# Patient Record
Sex: MALE | Race: WHITE | Employment: FULL TIME | URBAN - METROPOLITAN AREA
[De-identification: names, ages, dates, MRNs, and addresses within clinical notes are randomized per-mention and may not be internally consistent; named-entity substitution may affect disease eponyms.]

---

## 2019-05-31 ENCOUNTER — OFFICE VISIT (OUTPATIENT)
Dept: FAMILY MEDICINE CLINIC | Age: 39
End: 2019-05-31
Payer: COMMERCIAL

## 2019-05-31 VITALS
WEIGHT: 262 LBS | DIASTOLIC BLOOD PRESSURE: 80 MMHG | SYSTOLIC BLOOD PRESSURE: 120 MMHG | OXYGEN SATURATION: 97 % | HEART RATE: 69 BPM | HEIGHT: 72 IN | BODY MASS INDEX: 35.49 KG/M2

## 2019-05-31 DIAGNOSIS — Z87.898 HISTORY OF ALCOHOL USE DISORDER: ICD-10-CM

## 2019-05-31 DIAGNOSIS — Z76.89 ENCOUNTER TO ESTABLISH CARE: Primary | ICD-10-CM

## 2019-05-31 DIAGNOSIS — G89.29 CHRONIC RIGHT-SIDED LOW BACK PAIN, WITH SCIATICA PRESENCE UNSPECIFIED: ICD-10-CM

## 2019-05-31 DIAGNOSIS — M54.6 CHRONIC RIGHT-SIDED THORACIC BACK PAIN: ICD-10-CM

## 2019-05-31 DIAGNOSIS — M54.5 CHRONIC RIGHT-SIDED LOW BACK PAIN, WITH SCIATICA PRESENCE UNSPECIFIED: ICD-10-CM

## 2019-05-31 DIAGNOSIS — Z00.00 HEALTHCARE MAINTENANCE: ICD-10-CM

## 2019-05-31 DIAGNOSIS — G89.29 CHRONIC RIGHT-SIDED THORACIC BACK PAIN: ICD-10-CM

## 2019-05-31 DIAGNOSIS — E66.09 CLASS 2 OBESITY DUE TO EXCESS CALORIES WITH BODY MASS INDEX (BMI) OF 36.0 TO 36.9 IN ADULT, UNSPECIFIED WHETHER SERIOUS COMORBIDITY PRESENT: ICD-10-CM

## 2019-05-31 PROBLEM — E66.812 CLASS 2 OBESITY DUE TO EXCESS CALORIES WITH BODY MASS INDEX (BMI) OF 36.0 TO 36.9 IN ADULT: Status: ACTIVE | Noted: 2019-05-31

## 2019-05-31 PROBLEM — M54.50 CHRONIC RIGHT-SIDED LOW BACK PAIN: Status: ACTIVE | Noted: 2019-05-31

## 2019-05-31 LAB
A/G RATIO: 1.7 (ref 1.1–2.2)
ALBUMIN SERPL-MCNC: 4.7 G/DL (ref 3.4–5)
ALP BLD-CCNC: 58 U/L (ref 40–129)
ALT SERPL-CCNC: 28 U/L (ref 10–40)
ANION GAP SERPL CALCULATED.3IONS-SCNC: 12 MMOL/L (ref 3–16)
AST SERPL-CCNC: 17 U/L (ref 15–37)
BASOPHILS ABSOLUTE: 0.1 K/UL (ref 0–0.2)
BASOPHILS RELATIVE PERCENT: 1 %
BILIRUB SERPL-MCNC: 0.9 MG/DL (ref 0–1)
BUN BLDV-MCNC: 14 MG/DL (ref 7–20)
CALCIUM SERPL-MCNC: 9.1 MG/DL (ref 8.3–10.6)
CHLORIDE BLD-SCNC: 103 MMOL/L (ref 99–110)
CHOLESTEROL, TOTAL: 84 MG/DL (ref 0–199)
CO2: 25 MMOL/L (ref 21–32)
CREAT SERPL-MCNC: 0.7 MG/DL (ref 0.9–1.3)
EOSINOPHILS ABSOLUTE: 0.1 K/UL (ref 0–0.6)
EOSINOPHILS RELATIVE PERCENT: 1.7 %
GFR AFRICAN AMERICAN: >60
GFR NON-AFRICAN AMERICAN: >60
GLOBULIN: 2.8 G/DL
GLUCOSE BLD-MCNC: 117 MG/DL (ref 70–99)
HCT VFR BLD CALC: 43.3 % (ref 40.5–52.5)
HDLC SERPL-MCNC: 33 MG/DL (ref 40–60)
HEMOGLOBIN: 14.2 G/DL (ref 13.5–17.5)
LDL CHOLESTEROL CALCULATED: 17 MG/DL
LYMPHOCYTES ABSOLUTE: 1.3 K/UL (ref 1–5.1)
LYMPHOCYTES RELATIVE PERCENT: 23.3 %
MCH RBC QN AUTO: 30 PG (ref 26–34)
MCHC RBC AUTO-ENTMCNC: 32.9 G/DL (ref 31–36)
MCV RBC AUTO: 91.1 FL (ref 80–100)
MONOCYTES ABSOLUTE: 0.5 K/UL (ref 0–1.3)
MONOCYTES RELATIVE PERCENT: 10 %
NEUTROPHILS ABSOLUTE: 3.5 K/UL (ref 1.7–7.7)
NEUTROPHILS RELATIVE PERCENT: 64 %
PDW BLD-RTO: 13.8 % (ref 12.4–15.4)
PLATELET # BLD: 201 K/UL (ref 135–450)
PMV BLD AUTO: 8.6 FL (ref 5–10.5)
POTASSIUM SERPL-SCNC: 4.4 MMOL/L (ref 3.5–5.1)
RBC # BLD: 4.75 M/UL (ref 4.2–5.9)
SODIUM BLD-SCNC: 140 MMOL/L (ref 136–145)
TOTAL PROTEIN: 7.5 G/DL (ref 6.4–8.2)
TRIGL SERPL-MCNC: 168 MG/DL (ref 0–150)
TSH REFLEX: 0.92 UIU/ML (ref 0.27–4.2)
VLDLC SERPL CALC-MCNC: 34 MG/DL
WBC # BLD: 5.4 K/UL (ref 4–11)

## 2019-05-31 PROCEDURE — 99203 OFFICE O/P NEW LOW 30 MIN: CPT | Performed by: FAMILY MEDICINE

## 2019-05-31 RX ORDER — NAPROXEN 500 MG/1
500 TABLET ORAL 2 TIMES DAILY WITH MEALS
Qty: 60 TABLET | Refills: 1 | Status: SHIPPED | OUTPATIENT
Start: 2019-05-31 | End: 2020-03-13 | Stop reason: ALTCHOICE

## 2019-05-31 ASSESSMENT — PATIENT HEALTH QUESTIONNAIRE - PHQ9
SUM OF ALL RESPONSES TO PHQ9 QUESTIONS 1 & 2: 1
2. FEELING DOWN, DEPRESSED OR HOPELESS: 1
7. TROUBLE CONCENTRATING ON THINGS, SUCH AS READING THE NEWSPAPER OR WATCHING TELEVISION: 0
SUM OF ALL RESPONSES TO PHQ QUESTIONS 1-9: 5
5. POOR APPETITE OR OVEREATING: 1
SUM OF ALL RESPONSES TO PHQ QUESTIONS 1-9: 5
9. THOUGHTS THAT YOU WOULD BE BETTER OFF DEAD, OR OF HURTING YOURSELF: 0
6. FEELING BAD ABOUT YOURSELF - OR THAT YOU ARE A FAILURE OR HAVE LET YOURSELF OR YOUR FAMILY DOWN: 0
8. MOVING OR SPEAKING SO SLOWLY THAT OTHER PEOPLE COULD HAVE NOTICED. OR THE OPPOSITE, BEING SO FIGETY OR RESTLESS THAT YOU HAVE BEEN MOVING AROUND A LOT MORE THAN USUAL: 0
4. FEELING TIRED OR HAVING LITTLE ENERGY: 2
3. TROUBLE FALLING OR STAYING ASLEEP: 1
1. LITTLE INTEREST OR PLEASURE IN DOING THINGS: 0

## 2019-05-31 ASSESSMENT — ENCOUNTER SYMPTOMS
CONSTIPATION: 0
NAUSEA: 0
VOMITING: 0
DIARRHEA: 0
COLOR CHANGE: 0
TROUBLE SWALLOWING: 0
ABDOMINAL PAIN: 0
BLOOD IN STOOL: 0
SHORTNESS OF BREATH: 0
BACK PAIN: 1

## 2019-05-31 NOTE — PROGRESS NOTES
2019    This is a 44 y.o. male who presents for  Chief Complaint   Patient presents with   [address] Patient    Establish Care     No pcp in over 10 years    Back Pain       HPI:     In addition to CC above and ROS below:    now, fumes and fiberglass dust     Used to be a heavy drinker   Quit at age [age]   Case a day in his 25s     Back pain, mid right side  Not consistent  No triggers  No breathing problems    Ear pain, can't pinpoint  When he touches it, can't reproduce      Past Medical History:   Diagnosis Date    History of chicken pox        Past Surgical History:   Procedure Laterality Date    HERNIA REPAIR         Social History     Socioeconomic History    Marital status:      Spouse name: Not on file    Number of children: Not on file    Years of education: Not on file    Highest education level: Not on file   Occupational History    Not on file   Social Needs    Financial resource strain: Not on file    Food insecurity:     Worry: Not on file     Inability: Not on file    Transportation needs:     Medical: Not on file     Non-medical: Not on file   Tobacco Use    Smoking status: Former Smoker     Packs/day: 0.50     Start date:      Last attempt to quit: 2017     Years since quittin.4    Smokeless tobacco: Never Used   Substance and Sexual Activity    Alcohol use: Yes     Comment: Rarely 6 pack last 6-7 months    Drug use: Never    Sexual activity: Yes     Partners: Female     Comment: wife    Lifestyle    Physical activity:     Days per week: Not on file     Minutes per session: Not on file    Stress: Not on file   Relationships    Social connections:     Talks on phone: Not on file     Gets together: Not on file     Attends [employer] service: Not on file     Active member of club or organization: Not on file     Attends meetings of clubs or organizations: Not on file     Relationship status: Not on file    Intimate partner violence:     Fear of current or and well-nourished. No distress. HENT:   Head: Normocephalic and atraumatic. Right Ear: External ear normal.   Left Ear: External ear normal.   Eyes: Pupils are equal, round, and reactive to light. EOM are normal. Right eye exhibits no discharge. Left eye exhibits no discharge. Neck: Normal range of motion. Neck supple. No thyromegaly present. Cardiovascular: Normal rate, regular rhythm, normal heart sounds and intact distal pulses. Exam reveals no gallop and no friction rub. No murmur heard. Pulmonary/Chest: Effort normal and breath sounds normal. No respiratory distress. He has no wheezes. He has no rales. Abdominal: Soft. Bowel sounds are normal. He exhibits no distension and no mass. There is no tenderness. There is no guarding. No hernia. Neg amy's    Musculoskeletal: Normal range of motion. He exhibits no edema or tenderness. Thoracic back: He exhibits deformity (mildly inc muscle hypertrophy medial to scapula), pain and spasm. He exhibits normal range of motion, no tenderness, no bony tenderness, no swelling, no edema and no laceration. Back:    Lymphadenopathy:     He has no cervical adenopathy. Neurological: He is alert. He displays normal reflexes. No cranial nerve deficit or sensory deficit. Coordination normal.   Skin: Skin is warm and dry. He is not diaphoretic. No erythema. Psychiatric: He has a normal mood and affect. His behavior is normal. Judgment and thought content normal.   Nursing note and vitals reviewed. Plan  1. Encounter to establish care    2. Healthcare maintenance  Physical due in 3/20  - Comprehensive Metabolic Panel; Future  - CBC Auto Differential; Future  - Hemoglobin A1C; Future  - HIV Screen; Future  - Lipid Panel; Future  - TSH with Reflex; Future    3. Chronic right-sided low back pain, with sciatica presence unspecified  H/o with stretched given, work on posture. Muscle relaxer in the future if needed.   Schedule NSAIDs x14 days with food.  Heat/ice discussed. - naproxen (EC NAPROSYN) 500 MG EC tablet; Take 1 tablet by mouth 2 times daily (with meals)  Dispense: 60 tablet; Refill: 1  - [facility] Sariah Villalobos    4. Chronic right-sided thoracic back pain  Plan per above. US to assess remote possibility of Liver/GB etiology     5. History of alcohol use disorder  Drank daily in his 25s, quit at age [age]  - [address]; Future    6. Class 2 obesity due to excess calories with body mass index (BMI) of 36.0 to 36.9 in adult, unspecified whether serious comorbidity present  Nutrition discussed. Healthy content, appropriate portion control, and guidelines for daily exercise (min 30 mins daily of moderate cardio exercise x5 days/week and work up) all discussed. Recommendations made based on pt's health history and lifestyle. While assessing care for this patient, I have reviewed all pertinent lab work/imaging/ specialist notes and care in reference to those problems addressed above in detail. Appropriate medical decision making was based on this. Please note that portions of this note may have been completed with a voice recognition program. Efforts were made to edit the dictations but occasionally words are mis-transcribed.     Return in about 9 months (around 3/4/2020) for annual physical.

## 2019-05-31 NOTE — PATIENT INSTRUCTIONS
Patient Education        Learning About How to Have a Healthy Back  What causes back pain? Back pain is often caused by overuse, strain, or injury. For example, people often hurt their backs playing sports or working in the yard, being jolted in a car accident, or lifting something too heavy. Aging plays a part too. Your bones and muscles tend to lose strength as you age, which makes injury more likely. The spongy discs between the bones of the spine (vertebrae) may suffer from wear and tear and no longer provide enough cushion between the bones. A disc that bulges or breaks open (herniated disc) can press on nerves, causing back pain. In some people, back pain is the result of arthritis, broken vertebrae caused by bone loss (osteoporosis), illness, or a spine problem. Although most people have back pain at one time or another, there are steps you can take to make it less likely. How can you have a healthy back? Reduce stress on your back through good posture  Slumping or slouching alone may not cause low back pain. But after the back has been strained or injured, bad posture can make pain worse. · Sleep in a position that maintains your back's normal curves and on a mattress that feels comfortable. Sleep on your side with a pillow between your knees, or sleep on your back with a pillow under your knees. These positions can reduce strain on your back. · Stand and sit up straight. \"Good posture\" generally means your ears, shoulders, and hips are in a straight line. · If you must stand for a long time, put one foot on a stool, ledge, or box. Switch feet every now and then. · Sit in a chair that is low enough to let you place both feet flat on the floor with both knees nearly level with your hips. If your chair or desk is too high, use a footrest to raise your knees. Place a small pillow, a rolled-up towel, or a lumbar roll in the curve of your back if you need extra support.   · Try a kneeling chair, which helps tilt your hips forward. This takes pressure off your lower back. · Try sitting on an exercise ball. It can rock from side to side, which helps keep your back loose. · When driving, keep your knees nearly level with your hips. Sit straight, and drive with both hands on the steering wheel. Your arms should be in a slightly bent position. Reduce stress on your back through careful lifting  · Squat down, bending at the hips and knees only. If you need to, put one knee to the floor and extend your other knee in front of you, bent at a right angle (half kneeling). · Press your chest straight forward. This helps keep your upper back straight while keeping a slight arch in your low back. · Hold the load as close to your body as possible, at the level of your belly button (navel). · Use your feet to change direction, taking small steps. · Lead with your hips as you change direction. Keep your shoulders in line with your hips as you move. · Set down your load carefully, squatting with your knees and hips only. Exercise and stretch your back  · Do some exercise on most days of the week, if your doctor says it is okay. You can walk, run, swim, or cycle. · Stretch your back muscles. Here are a few exercises to try:  ? Lie on your back, and gently pull one bent knee to your chest. Put that foot back on the floor, and then pull the other knee to your chest.  ? Do pelvic tilts. Lie on your back with your knees bent. Tighten your stomach muscles. Pull your belly button (navel) in and up toward your ribs. You should feel like your back is pressing to the floor and your hips and pelvis are slightly lifting off the floor. Hold for 6 seconds while breathing smoothly. ? Sit with your back flat against a wall. · Keep your core muscles strong. The muscles of your back, belly (abdomen), and buttocks support your spine. ? Pull in your belly and imagine pulling your navel toward your spine.  Hold this for 6 seconds, then relax. Remember to keep breathing normally as you tense your muscles. ? Do curl-ups. Always do them with your knees bent. Keep your low back on the floor, and curl your shoulders toward your knees using a smooth, slow motion. Keep your arms folded across your chest. If this bothers your neck, try putting your hands behind your neck (not your head), with your elbows spread apart. ? Lie on your back with your knees bent and your feet flat on the floor. Tighten your belly muscles, and then push with your feet and raise your buttocks up a few inches. Hold this position 6 seconds as you continue to breathe normally, then lower yourself slowly to the floor. Repeat 8 to 12 times. ? If you like group exercise, try Pilates or yoga. These classes have poses that strengthen the core muscles. Lead a healthy lifestyle  · Stay at a healthy weight to avoid strain on your back. · Do not smoke. Smoking increases the risk of osteoporosis, which weakens the spine. If you need help quitting, talk to your doctor about stop-smoking programs and medicines. These can increase your chances of quitting for good. Where can you learn more? Go to https://RocketboompeKilimanjaro Energy.Turbogen. org and sign in to your [employer] account. Enter L315 in the LikeAndy box to learn more about \"Learning About How to Have a Healthy Back. \"     If you do not have an account, please click on the \"Sign Up Now\" link. Current as of: September 20, 2018  Content Version: 12.0  © 8994-8039 Healthwise, Incorporated. Care instructions adapted under license by [facility] [employer] [facility] ([facility]). If you have questions about a medical condition or this instruction, always ask your healthcare professional. [place] [address] any warranty or liability for your use of this information.          Patient Education        Back Care and Preventing Injuries: Care Instructions  Your Care Instructions    You can hurt your back doing many everyday activities: lifting a heavy box, bending down to garden, exercising at the gym, and even getting out of bed. But you can keep your back strong and healthy by doing some exercises. You also can follow a few tips for sitting, sleeping, and lifting to avoid hurting your back again. Talk to your doctor before you start an exercise program. Ask for help if you want to learn more about keeping your back healthy. Follow-up care is a key part of your treatment and safety. Be sure to make and go to all appointments, and call your doctor if you are having problems. It's also a good idea to know your test results and keep a list of the medicines you take. How can you care for yourself at home? · Stay at a healthy weight to avoid strain on your lower back. · Do not smoke. Smoking increases the risk of osteoporosis, which weakens the spine. If you need help quitting, talk to your doctor about stop-smoking programs and medicines. These can increase your chances of quitting for good. · Make sure you sleep in a position that maintains your back's normal curves and on a mattress that feels comfortable. Sleep on your side with a pillow between your knees, or sleep on your back with a pillow under your knees. These positions can reduce strain on your back. · When you get out of bed, lie on your side and bend both knees. Drop your feet over the edge of the bed as you push up with both arms. Scoot to the edge of the bed. Make sure your feet are in line with your rear end (buttocks), and then stand up. · If you must stand for a long time, put one foot on a stool, ledge, or box. Exercise to strengthen your back and other muscles  · Get at least 30 minutes of exercise on most days of the week. Walking is a good choice. You also may want to do other activities, such as running, swimming, cycling, or playing tennis or team sports. · Stretch your back muscles.  Here are few exercises to try:  ? Lie on your back with your knees bent and your feet flat on the floor. Gently pull one bent knee to your chest. Put that foot back on the floor, and then pull the other knee to your chest. Hold for 15 to 30 seconds. Repeat 2 to 4 times. ? Do pelvic tilts. Lie on your back with your knees bent. Tighten your stomach muscles. Pull your belly button (navel) in and up toward your ribs. You should feel like your back is pressing to the floor and your hips and pelvis are slightly lifting off the floor. Hold for 6 seconds while breathing smoothly. · Keep your core muscles strong. The muscles of your back, belly (abdomen), and buttocks support your spine. ? Pull in your belly, and imagine pulling your navel toward your spine. Hold this for 6 seconds, then relax. Remember to keep breathing normally as you tense your muscles. ? Do curl-ups. Always do them with your knees bent. Keep your low back on the floor, and curl your shoulders toward your knees using a smooth, slow motion. Keep your arms folded across your chest. If this bothers your neck, try putting your hands behind your neck (not your head), with your elbows spread apart. ? Lie on your back with your knees bent and your feet flat on the floor. Tighten your belly muscles, and then push with your feet and raise your buttocks up a few inches. Hold this position 6 seconds as you continue to breathe normally, then lower yourself slowly to the floor. Repeat 8 to 12 times. ? If you like group exercise, try Pilates or yoga. These classes have poses that strengthen the core muscles. Protect your back when you sit  · Place a small pillow, a rolled-up towel, or a lumbar roll in the curve of your back if you need extra support. · Sit in a chair that is low enough to let you place both feet flat on the floor with both knees nearly level with your hips. If your chair or desk is too high, use a foot rest to raise your knees. · When driving, keep your knees nearly level with your hips.  Sit straight, and drive with both hands on the steering wheel. Your arms should be in a slightly bent position. · Try a kneeling chair, which helps tilt your hips forward. This takes pressure off your lower back. · Try sitting on an exercise ball. It can rock from side to side, which helps keep your back loose. Lift properly  · Squat down, bending at the hips and knees only. If you need to, put one knee to the floor and extend your other knee in front of you, bent at a right angle (half kneeling). · Press your chest straight forward. This helps keep your upper back straight while keeping a slight arch in your low back. · Hold the load as close to your body as possible, at the level of your navel. · Use your feet to change direction, taking small steps. · Lead with your hips as you change direction. Keep your shoulders in line with your hips as you move. Do not twist your body. · Set down your load carefully, squatting with your knees and hips only. When should you call for help? Watch closely for changes in your health, and be sure to contact your doctor if you have any problems. Where can you learn more? Go to https://Qik.Qoof. org and sign in to your [employer] account. Enter S810 in the CleanMyCRM box to learn more about \"Back Care and Preventing Injuries: Care Instructions. \"     If you do not have an account, please click on the \"Sign Up Now\" link. Current as of: September 20, 2018  Content Version: 12.0  © 9243-7159 "[employer]". Care instructions adapted under license by [facility] ([facility]). If you have questions about a medical condition or this instruction, always ask your healthcare professional. [place] [address] any warranty or liability for your use of this information. Patient Education        Therapeutic Ball: Back Exercises  Your Care Instructions  Here are some examples of typical exercises for your condition. Start each exercise slowly.  Ease off the exercise if you start to have pain. Your doctor or physical therapist will tell you when you can start these exercises and which ones will work best for you. To prepare, make sure that your ball is the right size for you. When inflated and firm, it should allow you to sit with your hips and knees bent at about a 90-degree angle (like the letter L). How to do the exercises  Seated position on ball    1. Use this exercise to get used to moving on the ball and to find your best sitting position. 2. Sit comfortably on the ball with your feet about hip-width apart. If you feel unsteady, rest your hands on the ball near your hips. 3. As you do this exercise, try to keep your shoulders and upper body relaxed and still. 4. Using your stomach and back muscles to move your pelvis, roll the ball forward. This will round your back. 5. Still using your stomach and back muscles, roll the ball back. You will arch your back. 6. Repeat this rounding-arching motion a few times. 7. Stop in between the two positions, where your back is not rounded or arched. This is called your neutral position. Pelvic rotation    1. Sit tall on the ball. 2. Slowly rotate your hips in a [place] pattern. Keep the movement focused at your hips. 3. Repeat, but [place] in the other direction. 4. Repeat 8 to 12 times. Postural sitting    1. Use this position to find a stable, relaxed posture on the ball. You can use this position as your starting point for other ball exercises. If you feel unsteady on the ball, start on a chair first.  2. Sit on a ball or chair, with your feet planted straight in front of you. 3. Imagine that a string at the top of your head is pulling you straight up. Think of yourself as 2 inches taller than you are.  4. Slightly tuck your chin. 5. Keep your shoulders back and relaxed. Knee extension    1. Sit tall on the ball with your feet planted in front of you, hip-width apart.  As you do this exercise, avoid slumping your shoulders and arching your back. 2. Rest your hands on the ball near your hip or a steady object next to you. (If you feel very stable on the ball, rest your hands in your lap or at your side.)  3. Slowly straighten one leg at the knee. Slowly lower it back down. Repeat with the other leg. 4. Repeat this exercise 8 to 12 times. Roll-ups    1. Lie on your back with your knees bent, feet resting on the floor. 2. Lay the ball on your thighs. Rest your hands up high on the ball. 3. Raising your head and shoulder blades, roll the ball up your thighs. Exhale as you roll up. 4. If this is hard on your neck, gently support your lower head and upper neck with one hand. Don't use that hand to pull your head up. 5. Repeat 8 to 12 times. Ball curls    1. Lie on your back with your ankles resting on the ball, knees straight. 2. Use your legs to roll the exercise ball toward you. Allow your knees to bend and move closer to your chest.  3. Pause briefly, and then roll the ball to the starting position. Try to keep the ball rolling straight. You will feel the muscles in your lower belly working. 4. Repeat 8 to 12 times. Bridge with ball under legs    1. Lie on your back with your legs up, calves resting on the ball. For more challenge, rest your heels on the ball. 2. Look up at the ceiling, and keep your chin relaxed. You can place a small pillow under your head or neck for comfort. 3. With your arms by your side, press your hands onto the floor for stability. 4. Tighten your belly muscles by pulling in your belly button toward your spine. 5. Push your heels down toward the floor, squeeze your buttocks, and lift your hips off the floor until your shoulders, hips, and knees are all in a straight line. 6. Try to keep the ball steady. Hold for about 6 seconds as you continue to breathe normally. 7. Slowly lower your hips back down to the floor. 8. Repeat 8 to 12 times. Ball curls with bridge    1.  Start flat on your back with your ankles resting on the ball. 2. Look up at the ceiling, and keep your chin relaxed. You can place a small pillow under your head or neck for comfort. 3. With your arms by your side, press your hands onto the floor for stability. 4. Tighten your belly muscles by pulling in your belly button toward your spine. 5. Push your heels down toward the floor, squeeze your buttocks, and lift your hips off the floor until your shoulders, hips, and knees are all in a straight line. 6. While holding the bridge position, roll the ball toward you with your heels. Keep your hips as level as you can. 7. Pause briefly, and then roll the ball back out. Try to keep the ball rolling straight. You will feel the muscles in your lower belly working as you straighten your legs. 8. Lower your hips, and return to your starting position. 9. Repeat 8 to 12 times. 10. When you can keep your body and the ball steady throughout this exercise, you're ready for more challenge. Try keeping your hips raised while rolling the ball out, holding the bridge, and rolling back, a few times in a row. Praying chely    1. Kneel upright with the ball in front of you. 2. To start, clasp your hands together. Rest them on the ball in front of you. 3. As you do this exercise, keep your back and hips straight and tighten your belly and buttocks muscles. Keep your knees in place. 4. Press on the ball with your arms. Lean forward from the knees. This rolls the ball forward. You will bear most of your weight on your arms. 5. If your back starts to ache, you've gone too far. Pull back a bit. 6. Roll back to the start position. 7. Repeat 8 to 12 times. Walk-out plank on ball    1. Kneel over the ball. Place your hands on the floor in front of you. 2. Walk your hands forward until your legs are straight on the ball. This is the plank position. 3. When in plank position, hold your body straight and tighten your belly and buttocks muscles. Keep your chin slightly tucked. 4. Roll as far forward as you can without losing your balance or letting your hips drop. You may stop with the ball under your thighs, or even under your knees or shins. 5. Hold a few seconds, then walk your hands back and return to the start position. 6. Repeat 8 to 12 times. Push-up with thighs on ball    1. Kneel over the ball. Place your hands on the floor in front of you. 2. Walk your hands forward until your legs are straight on the ball. This is the plank position. 3. When in plank position, hold your body straight and tighten your belly and buttocks muscles. Keep your chin slightly tucked. 4. Roll as far forward as you can without losing your balance or letting your hips drop. You may stop with the ball under your thighs, or even under your knees or shins. 5. Bend your elbows. Slowly lower your body toward the ground as far as you can without losing your balance. 6. If your wrists hurt, try moving your hands a little farther apart so they're not right under your shoulders. 7. Slowly straighten your arms. 8. Do 8 to 12 of these push-ups. Wall squat with ball    1. Stand facing away from a wall. Place your feet about shoulder-width apart. 2. Place the ball between your middle back and the wall. Move your feet out in front of you so they are about a foot in front of your hips. 3. Keep your arms at your sides, or put your hands on your hips. 4. Slowly squat down as if you are going to sit in a chair, rolling your back over the ball as you squat. The ball should move with you but stay pressed into the wall. 5. Be sure that your knees do not go in front of your toes as you squat. 6. Hold for 6 seconds. 7. Slowly rise to your standing position. 8. Repeat 8 to 12 times. Child's pose with ball    1. Kneeling upright with your back straight, rest your hands on the ball in front of you. 2. Breathe out as you bend at the hips, and roll the ball forward.  Lower your chest toward the ground, and drop your hips back toward your heels. 3. To stretch your upper back and shoulders, hold this position for 15 to 30 seconds. 4. Repeat 2 to 4 times. Follow-up care is a key part of your treatment and safety. Be sure to make and go to all appointments, and call your doctor if you are having problems. It's also a good idea to know your test results and keep a list of the medicines you take. Where can you learn more? Go to https://PrivateGriffepePenBoutique.Callida Energy. org and sign in to your [employer]MayGou account. Enter U648 in the aWhere box to learn more about \"Therapeutic Ball: Back Exercises. \"     If you do not have an account, please click on the \"Sign Up Now\" link. Current as of: September 20, 2018  Content Version: 12.0  © 6257-2697 Healthwise, Divided. Care instructions adapted under license by [facility] ([facility]). If you have questions about a medical condition or this instruction, always ask your healthcare professional. [place] [address] any warranty or liability for your use of this information. Patient Education        Low Back Pain: Exercises  Your Care Instructions  Here are some examples of typical rehabilitation exercises for your condition. Start each exercise slowly. Ease off the exercise if you start to have pain. Your doctor or physical therapist will tell you when you can start these exercises and which ones will work best for you. How to do the exercises  Press-up    1. Lie on your stomach, supporting your body with your forearms. 2. Press your elbows down into the floor to raise your upper back. As you do this, relax your stomach muscles and allow your back to arch without using your back muscles. As your press up, do not let your hips or pelvis come off the floor. 3. Hold for 15 to 30 seconds, then relax. 4. Repeat 2 to 4 times. Alternate arm and leg (bird dog) exercise    1.  Start on the floor, on your hands and knees.  2. Tighten your belly muscles. 3. Raise one leg off the floor, and hold it straight out behind you. Be careful not to let your hip drop down, because that will twist your trunk. 4. Hold for about 6 seconds, then lower your leg and switch to the other leg. 5. Repeat 8 to 12 times on each leg. 6. Over time, work up to holding for 10 to 30 seconds each time. 7. If you feel stable and secure with your leg raised, try raising the opposite arm straight out in front of you at the same time. Knee-to-chest exercise    1. Lie on your back with your knees bent and your feet flat on the floor. 2. Bring one knee to your chest, keeping the other foot flat on the floor (or keeping the other leg straight, whichever feels better on your lower back). 3. Keep your lower back pressed to the floor. Hold for at least 15 to 30 seconds. 4. Relax, and lower the knee to the starting position. 5. Repeat with the other leg. Repeat 2 to 4 times with each leg. 6. To get more stretch, put your other leg flat on the floor while pulling your knee to your chest.    Curl-ups    1. Lie on the floor on your back with your knees bent at a 90-degree angle. Your feet should be flat on the floor, about 12 inches from your buttocks. 2. Cross your arms over your chest. If this bothers your neck, try putting your hands behind your neck (not your head), with your elbows spread apart. 3. Slowly tighten your belly muscles and raise your shoulder blades off the floor. 4. Keep your head in line with your body, and do not press your chin to your chest.  5. Hold this position for 1 or 2 seconds, then slowly lower yourself back down to the floor. 6. Repeat 8 to 12 times. Pelvic tilt exercise    1. Lie on your back with your knees bent. 2. \"Brace\" your stomach. This means to tighten your muscles by pulling in and imagining your belly button moving toward your spine.  You should feel like your back is pressing to the floor and your hips and pelvis are rocking back. 3. Hold for about 6 seconds while you breathe smoothly. 4. Repeat 8 to 12 times. Heel dig bridging    1. Lie on your back with both knees bent and your ankles bent so that only your heels are digging into the floor. Your knees should be bent about 90 degrees. 2. Then push your heels into the floor, squeeze your buttocks, and lift your hips off the floor until your shoulders, hips, and knees are all in a straight line. 3. Hold for about 6 seconds as you continue to breathe normally, and then slowly lower your hips back down to the floor and rest for up to 10 seconds. 4. Do 8 to 12 repetitions. Hamstring stretch in doorway    1. Lie on your back in a doorway, with one leg through the open door. 2. Slide your leg up the wall to straighten your knee. You should feel a gentle stretch down the back of your leg. 3. Hold the stretch for at least 15 to 30 seconds. Do not arch your back, point your toes, or bend either knee. Keep one heel touching the floor and the other heel touching the wall. 4. Repeat with your other leg. 5. Do 2 to 4 times for each leg. Hip flexor stretch    1. Kneel on the floor with one knee bent and one leg behind you. Place your forward knee over your foot. Keep your other knee touching the floor. 2. Slowly push your hips forward until you feel a stretch in the upper thigh of your rear leg. 3. Hold the stretch for at least 15 to 30 seconds. Repeat with your other leg. 4. Do 2 to 4 times on each side. Wall sit    1. Stand with your back 10 to 12 inches away from a wall. 2. Lean into the wall until your back is flat against it. 3. Slowly slide down until your knees are slightly bent, pressing your lower back into the wall. 4. Hold for about 6 seconds, then slide back up the wall. 5. Repeat 8 to 12 times. Follow-up care is a key part of your treatment and safety.  Be sure to make and go to all appointments, and call your doctor if you are having medicines. Read and follow all instructions on the label. ? If the doctor gave you a prescription medicine for pain, take it as prescribed. ? If you are not taking a prescription pain medicine, ask your doctor if you can take an over-the-counter medicine. · Try to find a comfortable sleeping position. ? Instead of your regular pillow, you can try a special neck pillow or a rolled-up towel under your neck. ? Try lying on your side with a pillow between your legs. Or lie on your back with a pillow under your knees. · Return to your usual level of activity slowly. When should you call for help? Call 911 anytime you think you may need emergency care. For example, call if:    · You are unable to move an arm or a leg at all.   [facility] your doctor now or seek immediate medical care if:    · You have new or worse symptoms in your arms, legs, chest, belly, or buttocks. Symptoms may include:  ? Numbness or tingling. ? Weakness. ? Pain.     · You lose bladder or bowel control.    Watch closely for changes in your health, and be sure to contact your doctor if:    · You are not getting better as expected. Where can you learn more? Go to https://DB Networks.Cytonics. org and sign in to your ArtistForce account. Enter U110 in the rubberit box to learn more about \"Upper and Middle Back (Thoracic) Strain: Care Instructions. \"     If you do not have an account, please click on the \"Sign Up Now\" link. Current as of: September 20, 2018  Content Version: 12.0  © 1052-8920 Healthwise, Incorporated. Care instructions adapted under license by [facility] ([facility]). If you have questions about a medical condition or this instruction, always ask your healthcare professional. [place] [address] any warranty or liability for your use of this information.          Patient Education        Gastroesophageal Reflux Disease (GERD): Care Instructions  Your Care Instructions    Gastroesophageal reflux disease chances of quitting for good. · If you have GERD symptoms at night, raise the head of your bed 6 to 8 inches by putting the frame on blocks or placing a foam wedge under the head of your mattress. (Adding extra pillows does not work.)  · Do not wear tight clothing around your middle. · Lose weight if you need to. Losing just 5 to 10 pounds can help. When should you call for help? Call your doctor now or seek immediate medical care if:    · You have new or different belly pain.     · Your stools are black and tarlike or have streaks of blood.    Watch closely for changes in your health, and be sure to contact your doctor if:    · Your symptoms have not improved after 2 days.     · Food seems to catch in your throat or chest.   Where can you learn more? Go to https://NetscapepeReichholdeb.Selatra. org and sign in to your xoompark account. Enter P729 in the Portable Internet box to learn more about \"Gastroesophageal Reflux Disease (GERD): Care Instructions. \"     If you do not have an account, please click on the \"Sign Up Now\" link. Current as of: November 7, 2018  Content Version: 12.0  © 0467-8244 Healthwise, Incorporated. Care instructions adapted under license by [facility] ([facility]). If you have questions about a medical condition or this instruction, always ask your healthcare professional. [address]  any warranty or liability for your use of this information.

## 2019-06-01 LAB
ESTIMATED AVERAGE GLUCOSE: 128.4 MG/DL
HBA1C MFR BLD: 6.1 %
HIV AG/AB: NORMAL
HIV ANTIGEN: NORMAL
HIV-1 ANTIBODY: NORMAL
HIV-2 AB: NORMAL

## 2019-06-12 ENCOUNTER — TELEPHONE (OUTPATIENT)
Dept: FAMILY MEDICINE CLINIC | Age: 39
End: 2019-06-12

## 2019-06-12 NOTE — TELEPHONE ENCOUNTER
Pt  called back and was given the following per Abelardo Alejandra MD   Results reviewed. They are normal/negative with the exceptions noted:     Hgb A1c was in the PREdiabetic range. His cholesterol panel was elevated, specifically histriglycerides are elevated. I would strongly recommend avoiding fatty/processed foods and focusing on lean meats/vegetables. We can recheck in 6-12 mo    Any questions, we can discuss at his next visit  Please inform patient. Thank you. Pt was given results and verbalized understanding   No further documentation was needed at the time of the call .

## 2019-06-14 ENCOUNTER — PROCEDURE VISIT (OUTPATIENT)
Dept: FAMILY MEDICINE CLINIC | Age: 39
End: 2019-06-14
Payer: COMMERCIAL

## 2019-06-14 ENCOUNTER — HOSPITAL ENCOUNTER (OUTPATIENT)
Dept: ULTRASOUND IMAGING | Age: 39
Discharge: HOME | End: 2019-06-14
Payer: COMMERCIAL

## 2019-06-14 VITALS
HEART RATE: 54 BPM | WEIGHT: 270 LBS | TEMPERATURE: 98.5 F | DIASTOLIC BLOOD PRESSURE: 80 MMHG | OXYGEN SATURATION: 93 % | BODY MASS INDEX: 37.13 KG/M2 | SYSTOLIC BLOOD PRESSURE: 110 MMHG

## 2019-06-14 DIAGNOSIS — D69.9 BLEEDS EASILY (HCC): ICD-10-CM

## 2019-06-14 DIAGNOSIS — L81.9 HYPERPIGMENTED SKIN LESION: Primary | ICD-10-CM

## 2019-06-14 DIAGNOSIS — Z87.898 HISTORY OF ALCOHOL USE DISORDER: ICD-10-CM

## 2019-06-14 LAB
APTT: 29.6 SEC (ref 26–36)
BASOPHILS ABSOLUTE: 0 K/UL (ref 0–0.2)
BASOPHILS RELATIVE PERCENT: 0.6 %
EOSINOPHILS ABSOLUTE: 0.2 K/UL (ref 0–0.6)
EOSINOPHILS RELATIVE PERCENT: 2.8 %
HCT VFR BLD CALC: 39 % (ref 40.5–52.5)
HEMOGLOBIN: 13.2 G/DL (ref 13.5–17.5)
INR BLD: 0.94 (ref 0.86–1.14)
LYMPHOCYTES ABSOLUTE: 1.4 K/UL (ref 1–5.1)
LYMPHOCYTES RELATIVE PERCENT: 21.4 %
MCH RBC QN AUTO: 29.7 PG (ref 26–34)
MCHC RBC AUTO-ENTMCNC: 33.8 G/DL (ref 31–36)
MCV RBC AUTO: 87.9 FL (ref 80–100)
MONOCYTES ABSOLUTE: 0.5 K/UL (ref 0–1.3)
MONOCYTES RELATIVE PERCENT: 7.5 %
NEUTROPHILS ABSOLUTE: 4.5 K/UL (ref 1.7–7.7)
NEUTROPHILS RELATIVE PERCENT: 67.7 %
PDW BLD-RTO: 13.4 % (ref 12.4–15.4)
PLATELET # BLD: 190 K/UL (ref 135–450)
PMV BLD AUTO: 8.8 FL (ref 5–10.5)
PROTHROMBIN TIME: 10.7 SEC (ref 9.8–13)
RBC # BLD: 4.44 M/UL (ref 4.2–5.9)
WBC # BLD: 6.6 K/UL (ref 4–11)

## 2019-06-14 PROCEDURE — 76705 ECHO EXAM OF ABDOMEN: CPT

## 2019-06-14 PROCEDURE — 99212 OFFICE O/P EST SF 10 MIN: CPT | Performed by: FAMILY MEDICINE

## 2019-06-14 PROCEDURE — 11102 TANGNTL BX SKIN SINGLE LES: CPT | Performed by: FAMILY MEDICINE

## 2019-06-14 ASSESSMENT — ENCOUNTER SYMPTOMS
NAUSEA: 0
SHORTNESS OF BREATH: 0
COLOR CHANGE: 1
VOMITING: 0

## 2019-06-14 NOTE — PROGRESS NOTES
2019    This is a 44 y.o. male who presents for  Chief Complaint   Patient presents with    Mole     right chest       HPI:     Mole on chest:  Change in color  + growth  Has been there for months  No personal or family Hx of skin Ca    Is bleeding more easily and is having a hard time stopping it  Had a cut on his arm, took 8 hours and 4 bandaids for it to stop  More nose bleeds   No family hx or personal Hx of bleeding issues      Past Medical History:   Diagnosis Date    History of chicken pox        Past Surgical History:   Procedure Laterality Date    HERNIA REPAIR         Social History     Socioeconomic History    Marital status:      Spouse name: Not on file    Number of children: Not on file    Years of education: Not on file    Highest education level: Not on file   Occupational History    Not on file   Social Needs    Financial resource strain: Not on file    Food insecurity:     Worry: Not on file     Inability: Not on file    Transportation needs:     Medical: Not on file     Non-medical: Not on file   Tobacco Use    Smoking status: Former Smoker     Packs/day: 0.50     Start date:      Last attempt to quit: 2017     Years since quittin.4    Smokeless tobacco: Never Used   Substance and Sexual Activity    Alcohol use: Yes     Comment: Rarely 6 pack last 6-7 months    Drug use: Never    Sexual activity: Yes     Partners: Female     Comment: wife    Lifestyle    Physical activity:     Days per week: Not on file     Minutes per session: Not on file    Stress: Not on file   Relationships    Social connections:     Talks on phone: Not on file     Gets together: Not on file     Attends Restorationist service: Not on file     Active member of club or organization: Not on file     Attends meetings of clubs or organizations: Not on file     Relationship status: Not on file    Intimate partner violence:     Fear of current or ex partner: Not on file     Emotionally abused: Not on file     Physically abused: Not on file     Forced sexual activity: Not on file   Other Topics Concern    Not on file   Social History Narrative    Not on file       Family History   Problem Relation Age of Onset   Javier Bryce Sclerosis Mother         2012    Colon Cancer Maternal Grandmother         62s    Colon Cancer Maternal Grandfather         46s    Colon Cancer Paternal Savannah Hannah Grandmother         80s    Colon Cancer Paternal Great Grandfather         90s       Current Outpatient Medications   Medication Sig Dispense Refill    naproxen (EC NAPROSYN) 500 MG EC tablet Take 1 tablet by mouth 2 times daily (with meals) 60 tablet 1     No current facility-administered medications for this visit. There is no immunization history on file for this patient. No Known Allergies    Review of Systems   Constitutional: Negative for chills, diaphoresis and fever. Respiratory: Negative for shortness of breath. Cardiovascular: Negative for palpitations. Gastrointestinal: Negative for nausea and vomiting. Musculoskeletal: Negative for arthralgias. Skin: Positive for color change. Negative for pallor, rash and wound. Neurological: Negative for numbness. Hematological: Negative for adenopathy. /80 (Site: Left Upper Arm, Position: Sitting, Cuff Size: Large Adult)   Pulse 54   Temp 98.5 °F (36.9 °C)   Wt 270 lb (122.5 kg)   SpO2 93%   BMI 37.13 kg/m²     Physical Exam   Constitutional: He is oriented to person, place, and time. He appears well-developed and well-nourished. No distress. HENT:   Head: Normocephalic and atraumatic. Eyes: Pupils are equal, round, and reactive to light. EOM are normal.   Neck: Normal range of motion. Neck supple. Cardiovascular: Normal rate and regular rhythm. Pulmonary/Chest: Effort normal. No respiratory distress. Musculoskeletal: Normal range of motion. He exhibits no edema, tenderness or deformity.    Lymphadenopathy:     He has no cervical adenopathy. Neurological: He is alert and oriented to person, place, and time. No sensory deficit. Skin: Skin is warm and dry. Capillary refill takes 2 to 3 seconds. No rash noted. He is not diaphoretic. No erythema. No pallor. Psychiatric: He has a normal mood and affect. His behavior is normal. Judgment and thought content normal.   Nursing note and vitals reviewed. Shave Biopsy Procedure Note    Pre-operative Diagnosis: Suspicious lesion    Post-operative Diagnosis: same    Locations:right chest    Indications: growth, color change, inflammation, irritation, bleeding    Anesthesia: Lidocaine 1% with epinephrine     Procedure Details   History of allergy to iodine: no  History of allergy to lidocaine: no    Patient informed of the risks (including bleeding and infection) and benefits of the   procedure and Verbal informed consent obtained. The lesion and surrounding area were given a sterile prep using alcohol and draped in the usual sterile fashion. A razor was used to shave an area of skin approximately 1.3 cm by 1.3cm. Hemostasis achieved with pressure . Antibiotic ointment and a sterile dressing applied. The specimen was sent for pathologic examination. The patient tolerated the procedure well. EBL: 0.1 ml    Findings:  Per above    Condition:  Stable    Complications:  none. Plan:  1. Instructed to keep the wound dry and covered for 24-48h and clean thereafter. 2. Warning signs of infection were reviewed. 3. Recommended that the patient use OTC analgesics as needed for pain. Wound care discussed in detail. Keep area dry for 24-48 hours. Cleanse wound BID with warm water and unfragranced soap. Apply topical triple antibiotics and clean dressing with every wound cleaning. Can leave open to the air once area is covered with a scab. Can apply Vaseline nightly to assist with the healing process. Plan  1.  Hyperpigmented skin lesion  R/o melanoma  - SURGICAL

## 2019-06-14 NOTE — PATIENT INSTRUCTIONS
Basic Wound care instruction from Dr. Ofelia Cruz    Keep the area dry for 24-48 hours after your procedure. Cleanse the wound twice daily with warm water and unfragranced soap (I.e. Orange Dial soap). Always wash your hands before you clean your wound. Apply topical triple antibiotic ointment (or any other ointment I discussed with you during my visit) and a clean dressing with every wound cleaning (either gauze if the wound is draining a lot or a large bandaid). You can leave the wound open to the air once the area is covered with a scab. You can apply Vaseline nightly to assist with the healing process. For pain, You can take over-the-counter Advil OR Motrin OR Ibuprofen 400-600 mg every 8 hours as needed, unless we talked about something specific during your visit, OR unless you have been told in the past not to take these medications.

## 2020-01-10 ENCOUNTER — OFFICE VISIT (OUTPATIENT)
Dept: FAMILY MEDICINE CLINIC | Age: 40
End: 2020-01-10
Payer: COMMERCIAL

## 2020-01-10 VITALS
DIASTOLIC BLOOD PRESSURE: 72 MMHG | HEART RATE: 79 BPM | OXYGEN SATURATION: 98 % | HEIGHT: 73 IN | SYSTOLIC BLOOD PRESSURE: 126 MMHG | RESPIRATION RATE: 16 BRPM | TEMPERATURE: 98.6 F | BODY MASS INDEX: 36.39 KG/M2 | WEIGHT: 274.6 LBS

## 2020-01-10 PROCEDURE — 99213 OFFICE O/P EST LOW 20 MIN: CPT | Performed by: NURSE PRACTITIONER

## 2020-01-10 RX ORDER — CEFDINIR 300 MG/1
300 CAPSULE ORAL 2 TIMES DAILY
Qty: 14 CAPSULE | Refills: 0 | Status: SHIPPED | OUTPATIENT
Start: 2020-01-10 | End: 2020-01-17

## 2020-01-10 ASSESSMENT — ENCOUNTER SYMPTOMS
SINUS PRESSURE: 1
COUGH: 1
CONSTIPATION: 0
DIARRHEA: 0
SORE THROAT: 0
NAUSEA: 0

## 2020-02-09 ENCOUNTER — HOSPITAL ENCOUNTER (EMERGENCY)
Age: 40
Discharge: HOME | End: 2020-02-09
Attending: EMERGENCY MEDICINE
Payer: COMMERCIAL

## 2020-02-09 VITALS
TEMPERATURE: 98.5 F | OXYGEN SATURATION: 95 % | SYSTOLIC BLOOD PRESSURE: 128 MMHG | DIASTOLIC BLOOD PRESSURE: 75 MMHG | HEART RATE: 102 BPM | RESPIRATION RATE: 16 BRPM | BODY MASS INDEX: 35.78 KG/M2 | WEIGHT: 270 LBS | HEIGHT: 73 IN

## 2020-02-09 LAB
RAPID INFLUENZA  B AGN: NEGATIVE
RAPID INFLUENZA A AGN: NEGATIVE

## 2020-02-09 PROCEDURE — 6370000000 HC RX 637 (ALT 250 FOR IP): Performed by: EMERGENCY MEDICINE

## 2020-02-09 PROCEDURE — 99283 EMERGENCY DEPT VISIT LOW MDM: CPT

## 2020-02-09 PROCEDURE — 87804 INFLUENZA ASSAY W/OPTIC: CPT

## 2020-02-09 RX ORDER — ACETAMINOPHEN 325 MG/1
650 TABLET ORAL ONCE
Status: COMPLETED | OUTPATIENT
Start: 2020-02-09 | End: 2020-02-09

## 2020-02-09 RX ORDER — ONDANSETRON 4 MG/1
4 TABLET, FILM COATED ORAL 3 TIMES DAILY PRN
Qty: 15 TABLET | Refills: 0 | Status: SHIPPED | OUTPATIENT
Start: 2020-02-09 | End: 2020-03-13 | Stop reason: ALTCHOICE

## 2020-02-09 RX ORDER — IBUPROFEN 600 MG/1
600 TABLET ORAL ONCE
Status: COMPLETED | OUTPATIENT
Start: 2020-02-09 | End: 2020-02-09

## 2020-02-09 RX ORDER — ONDANSETRON 4 MG/1
4 TABLET, ORALLY DISINTEGRATING ORAL ONCE
Status: COMPLETED | OUTPATIENT
Start: 2020-02-09 | End: 2020-02-09

## 2020-02-09 RX ADMIN — IBUPROFEN 600 MG: 600 TABLET, FILM COATED ORAL at 13:42

## 2020-02-09 RX ADMIN — ONDANSETRON 4 MG: 4 TABLET, ORALLY DISINTEGRATING ORAL at 13:42

## 2020-02-09 RX ADMIN — ACETAMINOPHEN 650 MG: 325 TABLET ORAL at 13:42

## 2020-02-09 ASSESSMENT — PAIN SCALES - GENERAL
PAINLEVEL_OUTOF10: 5
PAINLEVEL_OUTOF10: 7

## 2020-02-09 NOTE — ED PROVIDER NOTES
intact. Ears, nose, mouth and throat:  Oral mucosa moist, no exudates. Neck:  Trachea midline. Heart: Slightly tachycardic but regular  Perfusion:  intact  Respiratory:  Lungs clear to auscultation bilaterally. Respirations nonlabored. Abdominal:   Non distended. Nontender  Neurological:  Alert and oriented x 3. Moves all extremities spontaneously  Musculoskeletal:   Normal ROM, no deformities          Psychiatric:  Normal mood        DIAGNOSTIC RESULTS       Labs Reviewed   RAPID INFLUENZA A/B ANTIGENS    Narrative:     Performed at:  [address],  [place], [place]   Phone [phone]       Interpretation per the Radiologist below, if obtained/available at the time of this note:    No orders to display       All other labs/imaging were within normal range or not returned as of this dictation. EMERGENCY DEPARTMENT COURSE and DIFFERENTIAL DIAGNOSIS/MDM:   Vitals:    Vitals:    02/09/20 1311   BP: 128/75   Pulse: 109   Resp: 16   Temp: 98.5 °F (36.9 °C)   TempSrc: Oral   SpO2: 95%   Weight: 270 lb (122.5 kg)   Height: 6' 1\" (1.854 m)       Patient presents emergency department today with constellation of symptoms likely consistent with a viral etiology. Has no abdominal pain at present but was having difficulty tolerating oral intake at home. Influenza testing was sent and negative. Patient given Tylenol, Motrin, and Zofran is tolerating oral intake and will be discharged home with strict return precautions. Did not feel further imaging or laboratory studies necessary    MDM    CONSULTS     None    Critical Care:   None    REASSESSMENT          PROCEDURE     Unless otherwise noted below, none     Procedures      FINAL IMPRESSION      1. Diarrhea, unspecified type    2.  Viral illness            DISPOSITION/PLAN   DISPOSITION Decision To Discharge 02/09/2020 01:34:25 PM        PATIENT REFERRED TO:  MD Robbie Bates [address] 84

## 2020-03-06 ENCOUNTER — OFFICE VISIT (OUTPATIENT)
Dept: FAMILY MEDICINE CLINIC | Age: 40
End: 2020-03-06
Payer: COMMERCIAL

## 2020-03-06 VITALS
OXYGEN SATURATION: 98 % | HEART RATE: 75 BPM | SYSTOLIC BLOOD PRESSURE: 114 MMHG | TEMPERATURE: 98.3 F | DIASTOLIC BLOOD PRESSURE: 72 MMHG

## 2020-03-06 LAB
A/G RATIO: 1.7 (ref 1.1–2.2)
ALBUMIN SERPL-MCNC: 4.6 G/DL (ref 3.4–5)
ALP BLD-CCNC: 67 U/L (ref 40–129)
ALT SERPL-CCNC: 42 U/L (ref 10–40)
ANION GAP SERPL CALCULATED.3IONS-SCNC: 11 MMOL/L (ref 3–16)
AST SERPL-CCNC: 22 U/L (ref 15–37)
BASOPHILS ABSOLUTE: 0 K/UL (ref 0–0.2)
BASOPHILS RELATIVE PERCENT: 0.5 %
BILIRUB SERPL-MCNC: 0.4 MG/DL (ref 0–1)
BILIRUBIN, POC: ABNORMAL
BLOOD URINE, POC: ABNORMAL
BUN BLDV-MCNC: 14 MG/DL (ref 7–20)
CALCIUM SERPL-MCNC: 9.2 MG/DL (ref 8.3–10.6)
CHLORIDE BLD-SCNC: 104 MMOL/L (ref 99–110)
CLARITY, POC: CLEAR
CO2: 24 MMOL/L (ref 21–32)
COLOR, POC: YELLOW
CREAT SERPL-MCNC: 0.9 MG/DL (ref 0.9–1.3)
EOSINOPHILS ABSOLUTE: 0.1 K/UL (ref 0–0.6)
EOSINOPHILS RELATIVE PERCENT: 2.3 %
GFR AFRICAN AMERICAN: >60
GFR NON-AFRICAN AMERICAN: >60
GLOBULIN: 2.7 G/DL
GLUCOSE BLD-MCNC: 103 MG/DL (ref 70–99)
GLUCOSE URINE, POC: ABNORMAL
HCT VFR BLD CALC: 41.9 % (ref 40.5–52.5)
HEMOGLOBIN: 14.4 G/DL (ref 13.5–17.5)
KETONES, POC: ABNORMAL
LEUKOCYTE EST, POC: ABNORMAL
LYMPHOCYTES ABSOLUTE: 1.8 K/UL (ref 1–5.1)
LYMPHOCYTES RELATIVE PERCENT: 28.3 %
MCH RBC QN AUTO: 30.3 PG (ref 26–34)
MCHC RBC AUTO-ENTMCNC: 34.2 G/DL (ref 31–36)
MCV RBC AUTO: 88.4 FL (ref 80–100)
MONOCYTES ABSOLUTE: 0.6 K/UL (ref 0–1.3)
MONOCYTES RELATIVE PERCENT: 9.8 %
NEUTROPHILS ABSOLUTE: 3.8 K/UL (ref 1.7–7.7)
NEUTROPHILS RELATIVE PERCENT: 59.1 %
NITRITE, POC: ABNORMAL
PDW BLD-RTO: 13.4 % (ref 12.4–15.4)
PH, POC: 6.5
PLATELET # BLD: 215 K/UL (ref 135–450)
PMV BLD AUTO: 8.9 FL (ref 5–10.5)
POTASSIUM SERPL-SCNC: 4.3 MMOL/L (ref 3.5–5.1)
PROTEIN, POC: ABNORMAL
RBC # BLD: 4.74 M/UL (ref 4.2–5.9)
SODIUM BLD-SCNC: 139 MMOL/L (ref 136–145)
SPECIFIC GRAVITY, POC: 1.02
TOTAL PROTEIN: 7.3 G/DL (ref 6.4–8.2)
UROBILINOGEN, POC: 0.2
WBC # BLD: 6.4 K/UL (ref 4–11)

## 2020-03-06 PROCEDURE — 81002 URINALYSIS NONAUTO W/O SCOPE: CPT | Performed by: FAMILY MEDICINE

## 2020-03-06 PROCEDURE — 99214 OFFICE O/P EST MOD 30 MIN: CPT | Performed by: FAMILY MEDICINE

## 2020-03-06 ASSESSMENT — ENCOUNTER SYMPTOMS
DIARRHEA: 1
BELCHING: 1
NAUSEA: 1
ABDOMINAL PAIN: 1

## 2020-03-06 ASSESSMENT — PATIENT HEALTH QUESTIONNAIRE - PHQ9
SUM OF ALL RESPONSES TO PHQ QUESTIONS 1-9: 0
1. LITTLE INTEREST OR PLEASURE IN DOING THINGS: 0
2. FEELING DOWN, DEPRESSED OR HOPELESS: 0
SUM OF ALL RESPONSES TO PHQ QUESTIONS 1-9: 0
SUM OF ALL RESPONSES TO PHQ9 QUESTIONS 1 & 2: 0

## 2020-03-06 NOTE — PROGRESS NOTES
distress. Appearance: He is well-developed. He is obese. He is not toxic-appearing or diaphoretic. HENT:      Head: Normocephalic. Nose: Nose normal. No congestion or rhinorrhea. Mouth/Throat:      Mouth: Mucous membranes are moist.      Pharynx: Oropharynx is clear. No oropharyngeal exudate or posterior oropharyngeal erythema. Neck:      Musculoskeletal: Normal range of motion. No neck rigidity. Cardiovascular:      Rate and Rhythm: Normal rate and regular rhythm. Pulses: Normal pulses. Heart sounds: No murmur. Pulmonary:      Effort: Pulmonary effort is normal. No respiratory distress. Breath sounds: Normal breath sounds. No wheezing. Abdominal:      General: Abdomen is flat. Bowel sounds are normal. There is no distension. Palpations: Abdomen is soft. There is no mass. Tenderness: There is no abdominal tenderness. Lymphadenopathy:      Cervical: No cervical adenopathy. Neurological:      Mental Status: He is alert. Psychiatric:         Mood and Affect: Mood normal.         Behavior: Behavior normal.         Thought Content: Thought content normal.         Current Outpatient Medications   Medication Sig Dispense Refill    ondansetron (ZOFRAN) 4 MG tablet Take 1 tablet by mouth 3 times daily as needed for Nausea or Vomiting 15 tablet 0    naproxen (EC NAPROSYN) 500 MG EC tablet Take 1 tablet by mouth 2 times daily (with meals) 60 tablet 1     No current facility-administered medications for this visit. Assessment:    1. Abdominal pain, unspecified abdominal location    2. Diarrhea, unspecified type    3. Nausea        Plan:    1. Abdominal pain, unspecified abdominal location  Unclear etiology. Seen recently in the ER and diagnosed with a viral gastroenteritis. I recommend starting Pepcid in case there is a gastritis component. He apparently no longer drinks alcohol. Trace blood in the urine but I am not concerned. No urinary symptoms.   We will send his urine for culture. Ultrasound of the gallbladder was normal in June 2019.  - POCT Urinalysis no Micro  - CBC Auto Differential  - Comprehensive Metabolic Panel  - URINE CULTURE    2. Diarrhea, unspecified type  He recently had antibiotics and felt his diarrhea began soon after that. We will check C. difficile to see if this is positive. In the meantime contact precautions discussed. - C DIFF TOXIN/ANTIGEN; Future  - GI Bacterial Pathogens By PCR; Future  - CBC Auto Differential  - Comprehensive Metabolic Panel    3. Nausea  Zofran is working well. Patient to return to clinic if symptoms worsen or fail to improve.

## 2020-03-08 LAB — URINE CULTURE, ROUTINE: NORMAL

## 2020-03-13 ENCOUNTER — OFFICE VISIT (OUTPATIENT)
Dept: FAMILY MEDICINE CLINIC | Age: 40
End: 2020-03-13
Payer: COMMERCIAL

## 2020-03-13 VITALS
HEART RATE: 82 BPM | DIASTOLIC BLOOD PRESSURE: 78 MMHG | HEIGHT: 73 IN | SYSTOLIC BLOOD PRESSURE: 118 MMHG | WEIGHT: 271 LBS | OXYGEN SATURATION: 92 % | BODY MASS INDEX: 35.92 KG/M2 | TEMPERATURE: 98.5 F

## 2020-03-13 DIAGNOSIS — Z00.00 ANNUAL PHYSICAL EXAM: ICD-10-CM

## 2020-03-13 LAB
CHOLESTEROL, TOTAL: 80 MG/DL (ref 0–199)
HDLC SERPL-MCNC: 29 MG/DL (ref 40–60)
LDL CHOLESTEROL CALCULATED: 4 MG/DL
PROSTATE SPECIFIC ANTIGEN: 0.67 NG/ML (ref 0–4)
TRIGL SERPL-MCNC: 235 MG/DL (ref 0–150)
TSH REFLEX: 0.79 UIU/ML (ref 0.27–4.2)
VLDLC SERPL CALC-MCNC: 47 MG/DL

## 2020-03-13 PROCEDURE — 99395 PREV VISIT EST AGE 18-39: CPT | Performed by: FAMILY MEDICINE

## 2020-03-13 PROCEDURE — 90715 TDAP VACCINE 7 YRS/> IM: CPT | Performed by: FAMILY MEDICINE

## 2020-03-13 PROCEDURE — 90471 IMMUNIZATION ADMIN: CPT | Performed by: FAMILY MEDICINE

## 2020-03-13 ASSESSMENT — ENCOUNTER SYMPTOMS
VOMITING: 0
SHORTNESS OF BREATH: 0
COUGH: 0
DIARRHEA: 0
CONSTIPATION: 0
BACK PAIN: 0
COLOR CHANGE: 0
TROUBLE SWALLOWING: 0
ABDOMINAL PAIN: 0
NAUSEA: 0
BLOOD IN STOOL: 0

## 2020-03-13 NOTE — PROGRESS NOTES
Jannette Gitelman  YOB: 1980    Date of Service:  3/13/2020    Chief Complaint:   Jannette Gitelman is a 44 y.o. male who presents for a preventative visit     HPI:    Self-testicular exams: No  Sexual activity: has sex with female, wife   Abnormal Sxs: no  Family Hx of prostate Ca: yes  PSA testing: no    Previous Colonoscopy no  BRBR, unexplained WL, bloating, abd pain No  Family Hx of Colon Ca  yes - updated, multiple     Exercise: walks 5 time(s) per week  Diet: trying to eat     Wt Readings from Last 3 Encounters:   03/13/20 271 lb (122.9 kg)   02/09/20 270 lb (122.5 kg)   01/10/20 274 lb 9.6 oz (124.6 kg)     BP Readings from Last 3 Encounters:   03/13/20 118/78   03/06/20 114/72   02/09/20 128/75       Patient Active Problem List   Diagnosis    History of alcohol use disorder    Chronic right-sided thoracic back pain    Chronic right-sided low back pain    Class 2 obesity due to excess calories with body mass index (BMI) of 36.0 to 36.9 in adult       Health Maintenance   Topic Date Due    Varicella vaccine (1 of 2 - 2-dose childhood series) 04/12/1981    A1C test (Diabetic or Prediabetic)  05/31/2020    DTaP/Tdap/Td vaccine (2 - Td) 03/13/2030    Shingles Vaccine (1 of 2) 04/12/2030    Flu vaccine  Completed    HIV screen  Completed    Hepatitis A vaccine  Aged Out    Hepatitis B vaccine  Aged Out    Hib vaccine  Aged Out    Meningococcal (ACWY) vaccine  Aged Out    Pneumococcal 0-64 years Vaccine  Aged [employer] History   Administered Date(s) Administered    Influenza Virus Vaccine 11/01/2019    Influenza, Quadv, IM, PF (6 mo and older Fluzone, Flulaval, Fluarix, and 3 yrs and older Afluria) 09/20/2019    Tdap (Boostrix, Adacel) 03/13/2020       No Known Allergies  No current outpatient medications on file. No current facility-administered medications for this visit.         Past Medical History:   Diagnosis Date    History of chicken pox      Past Surgical

## 2020-03-14 LAB
ESTIMATED AVERAGE GLUCOSE: 128.4 MG/DL
HBA1C MFR BLD: 6.1 %

## 2020-11-25 ENCOUNTER — TELEPHONE (OUTPATIENT)
Dept: FAMILY MEDICINE CLINIC | Age: 40
End: 2020-11-25

## 2020-11-30 ENCOUNTER — OFFICE VISIT (OUTPATIENT)
Dept: FAMILY MEDICINE CLINIC | Age: 40
End: 2020-11-30
Payer: COMMERCIAL

## 2020-11-30 VITALS
WEIGHT: 259 LBS | HEART RATE: 76 BPM | HEIGHT: 73 IN | DIASTOLIC BLOOD PRESSURE: 72 MMHG | OXYGEN SATURATION: 99 % | BODY MASS INDEX: 34.33 KG/M2 | TEMPERATURE: 98.5 F | RESPIRATION RATE: 16 BRPM | SYSTOLIC BLOOD PRESSURE: 115 MMHG

## 2020-11-30 DIAGNOSIS — N48.89 PENILE PAIN, CHRONIC: ICD-10-CM

## 2020-11-30 DIAGNOSIS — G89.29 PENILE PAIN, CHRONIC: ICD-10-CM

## 2020-11-30 LAB
BILIRUBIN URINE: NEGATIVE
BLOOD, URINE: NEGATIVE
CLARITY: CLEAR
COLOR: YELLOW
EPITHELIAL CELLS, UA: 0 /HPF (ref 0–5)
GLUCOSE URINE: NEGATIVE MG/DL
HYALINE CASTS: 0 /LPF (ref 0–8)
KETONES, URINE: NEGATIVE MG/DL
LEUKOCYTE ESTERASE, URINE: NEGATIVE
MICROSCOPIC EXAMINATION: NORMAL
NITRITE, URINE: NEGATIVE
PH UA: 6 (ref 5–8)
PROTEIN UA: NEGATIVE MG/DL
RBC UA: 0 /HPF (ref 0–4)
SPECIFIC GRAVITY UA: 1.01 (ref 1–1.03)
SPECIMEN TYPE: ABNORMAL
TRICHOMONAS VAGINALIS SCREEN: POSITIVE
URINE TYPE: NORMAL
UROBILINOGEN, URINE: 0.2 E.U./DL
WBC UA: 0 /HPF (ref 0–5)

## 2020-11-30 PROCEDURE — 99214 OFFICE O/P EST MOD 30 MIN: CPT | Performed by: FAMILY MEDICINE

## 2020-11-30 SDOH — ECONOMIC STABILITY: TRANSPORTATION INSECURITY
IN THE PAST 12 MONTHS, HAS THE LACK OF TRANSPORTATION KEPT YOU FROM MEDICAL APPOINTMENTS OR FROM GETTING MEDICATIONS?: NO

## 2020-11-30 SDOH — ECONOMIC STABILITY: FOOD INSECURITY: WITHIN THE PAST 12 MONTHS, YOU WORRIED THAT YOUR FOOD WOULD RUN OUT BEFORE YOU GOT MONEY TO BUY MORE.: NEVER TRUE

## 2020-11-30 SDOH — HEALTH STABILITY: PHYSICAL HEALTH: ON AVERAGE, HOW MANY MINUTES DO YOU ENGAGE IN EXERCISE AT THIS LEVEL?: 0 MIN

## 2020-11-30 SDOH — ECONOMIC STABILITY: TRANSPORTATION INSECURITY
IN THE PAST 12 MONTHS, HAS LACK OF TRANSPORTATION KEPT YOU FROM MEETINGS, WORK, OR FROM GETTING THINGS NEEDED FOR DAILY LIVING?: NO

## 2020-11-30 SDOH — HEALTH STABILITY: PHYSICAL HEALTH: ON AVERAGE, HOW MANY DAYS PER WEEK DO YOU ENGAGE IN MODERATE TO STRENUOUS EXERCISE (LIKE A BRISK WALK)?: 0 DAYS

## 2020-11-30 SDOH — ECONOMIC STABILITY: INCOME INSECURITY: HOW HARD IS IT FOR YOU TO PAY FOR THE VERY BASICS LIKE FOOD, HOUSING, MEDICAL CARE, AND HEATING?: NOT VERY HARD

## 2020-11-30 SDOH — ECONOMIC STABILITY: FOOD INSECURITY: WITHIN THE PAST 12 MONTHS, THE FOOD YOU BOUGHT JUST DIDN'T LAST AND YOU DIDN'T HAVE MONEY TO GET MORE.: NEVER TRUE

## 2020-11-30 SDOH — HEALTH STABILITY: MENTAL HEALTH
STRESS IS WHEN SOMEONE FEELS TENSE, NERVOUS, ANXIOUS, OR CAN'T SLEEP AT NIGHT BECAUSE THEIR MIND IS TROUBLED. HOW STRESSED ARE YOU?: ONLY A LITTLE

## 2020-11-30 ASSESSMENT — ENCOUNTER SYMPTOMS
COUGH: 0
RHINORRHEA: 0
DIARRHEA: 0
BACK PAIN: 0
CHEST TIGHTNESS: 0
BLOOD IN STOOL: 0
COLOR CHANGE: 0
CONSTIPATION: 0
SHORTNESS OF BREATH: 0
SORE THROAT: 0
ABDOMINAL PAIN: 0
NAUSEA: 0
TROUBLE SWALLOWING: 0
VOMITING: 0

## 2020-11-30 NOTE — PROGRESS NOTES
a little   Relationships    Social connections     Talks on phone: Not on file     Gets together: Not on file     Attends Jew service: Not on file     Active member of club or organization: Not on file     Attends meetings of clubs or organizations: Not on file     Relationship status: Not on file    Intimate partner violence     Fear of current or ex partner: Not on file     Emotionally abused: Not on file     Physically abused: Not on file     Forced sexual activity: Not on file   Other Topics Concern    Not on file   Social History Narrative    Not on file       Family History   Problem Relation Age of Onset   Carina Catalan Sclerosis Mother         2012    Colon Cancer Maternal Grandmother         62s    Colon Cancer Maternal Grandfather         46s    Cancer Maternal Grandfather         prostate     Colon Cancer Paternal Masood Comas         [de-identified]    Colon Cancer Paternal Great Grandfather         90s       No current outpatient medications on file. No current facility-administered medications for this visit. Immunization History   Administered Date(s) Administered    Influenza Virus Vaccine 11/01/2019, 10/20/2020    Influenza, Quadv, IM, PF (6 mo and older Fluzone, Flulaval, Fluarix, and 3 yrs and older Afluria) 09/20/2019    Tdap (Boostrix, Adacel) 03/13/2020       No Known Allergies    Review of Systems   Constitutional: Negative for activity change, appetite change, chills, diaphoresis, fatigue, fever and unexpected weight change. HENT: Negative for congestion, ear pain, hearing loss, rhinorrhea, sore throat, tinnitus and trouble swallowing. Eyes: Negative for visual disturbance. Respiratory: Negative for cough, chest tightness and shortness of breath. Cardiovascular: Negative for chest pain, palpitations and leg swelling. Gastrointestinal: Negative for abdominal pain, blood in stool, constipation, diarrhea, nausea and vomiting.    Genitourinary: Positive for difficulty urinating and penile pain. Negative for decreased urine volume, discharge, dysuria, flank pain, frequency, genital sores, hematuria, penile swelling, scrotal swelling, testicular pain and urgency. Musculoskeletal: Negative for arthralgias and back pain. Skin: Negative for color change and rash. Neurological: Negative for dizziness, weakness, light-headedness, numbness and headaches. Psychiatric/Behavioral: Negative for dysphoric mood and sleep disturbance. The patient is not nervous/anxious. /72 (Site: Left Upper Arm, Position: Sitting, Cuff Size: Large Adult)   Pulse 76   Temp 98.5 °F (36.9 °C) (Infrared)   Resp 16   Ht 6' 1\" (1.854 m)   Wt 259 lb (117.5 kg)   SpO2 99%   BMI 34.17 kg/m²     Physical Exam  Vitals signs and nursing note reviewed. Exam conducted with a chaperone present. Constitutional:       General: He is not in acute distress. Appearance: He is well-developed. He is not diaphoretic. HENT:      Head: Normocephalic and atraumatic. Eyes:      Pupils: Pupils are equal, round, and reactive to light. Neck:      Musculoskeletal: Normal range of motion and neck supple. Cardiovascular:      Rate and Rhythm: Normal rate. Heart sounds: Normal heart sounds. Pulmonary:      Effort: Pulmonary effort is normal.   Abdominal:      Palpations: Abdomen is soft. Hernia: There is no hernia in the left inguinal area or right inguinal area. Genitourinary:     Pubic Area: No rash or pubic lice. Penis: Circumcised. Discharge (very small amount of clear) present. No phimosis, paraphimosis, hypospadias, erythema, tenderness, swelling or lesions. Scrotum/Testes: Cremasteric reflex is present. Right: Mass, tenderness or swelling not present. Right testis is descended. Cremasteric reflex is present. Left: Tenderness present. Mass or swelling not present. Left testis is descended. Cremasteric reflex is present. Emmanuel stage (genital): 5.

## 2020-12-01 LAB
C. TRACHOMATIS DNA ,URINE: NEGATIVE
N. GONORRHOEAE DNA, URINE: NEGATIVE
URINE CULTURE, ROUTINE: NORMAL

## 2020-12-02 ENCOUNTER — PATIENT MESSAGE (OUTPATIENT)
Dept: FAMILY MEDICINE CLINIC | Age: 40
End: 2020-12-02

## 2020-12-02 NOTE — TELEPHONE ENCOUNTER
From: Oscar Pantoja  To: Koko Lamar MD  Sent: 12/2/2020 1:52 PM EST  Subject: Test Results Question    With a recent positive test result, from the last visit, is any prescription needed? Or will the trichomonas vaginalis go away on its own?

## 2020-12-07 RX ORDER — METRONIDAZOLE 500 MG/1
2000 TABLET ORAL ONCE
Qty: 4 TABLET | Refills: 0 | Status: SHIPPED | OUTPATIENT
Start: 2020-12-07 | End: 2020-12-07

## 2021-08-31 ENCOUNTER — TELEPHONE (OUTPATIENT)
Dept: FAMILY MEDICINE CLINIC | Age: 41
End: 2021-08-31

## 2021-08-31 NOTE — TELEPHONE ENCOUNTER
----- Message from Karlene Velazquez sent at 8/31/2021 11:56 AM EDT -----  Subject: Appointment Request    Reason for Call: Routine Physical Exam    QUESTIONS  Type of Appointment? Established Patient  Reason for appointment request? No appointments available during search  Additional Information for Provider? Needs a work physical done by the   15th.  ---------------------------------------------------------------------------  --------------  [address]  What is the best way for the office to contact you? OK to leave message on   voicemail  Preferred Call Back Phone Number? [phone]  ---------------------------------------------------------------------------  --------------  SCRIPT ANSWERS  Relationship to Patient? Self  (If the patient has Medicare as their primary insurance coverage ask this   question) Are you requesting a Medicare Annual Wellness Visit? No  (Is the patient requesting a pap smear with their physical exam?)? No  (Is the patient requesting their annual physical and does not need PAP or   AWV per above?)? Yes   Have you been diagnosed with, awaiting test results for, or told that you   are suspected of having COVID-19 (Coronavirus)? (If patient has tested   negative or was tested as a requirement for work, school, or travel and   not based on symptoms, answer no)? No  Do you currently have flu-like symptoms including fever or chills, cough,   shortness of breath, difficulty breathing, or new loss of taste or smell? No  Have you had close contact with someone with COVID-19 in the last 14 days? No  (Service Expert  click yes below to proceed with in[facility] As Usual   Scheduling)?  Yes

## 2021-09-10 ENCOUNTER — OFFICE VISIT (OUTPATIENT)
Dept: FAMILY MEDICINE CLINIC | Age: 41
End: 2021-09-10
Payer: COMMERCIAL

## 2021-09-10 VITALS
OXYGEN SATURATION: 96 % | HEART RATE: 78 BPM | BODY MASS INDEX: 37.88 KG/M2 | HEIGHT: 71 IN | SYSTOLIC BLOOD PRESSURE: 110 MMHG | DIASTOLIC BLOOD PRESSURE: 72 MMHG | WEIGHT: 270.6 LBS

## 2021-09-10 DIAGNOSIS — Z13.29 SCREENING FOR THYROID DISORDER: ICD-10-CM

## 2021-09-10 DIAGNOSIS — E66.09 CLASS 2 OBESITY DUE TO EXCESS CALORIES WITH BODY MASS INDEX (BMI) OF 36.0 TO 36.9 IN ADULT, UNSPECIFIED WHETHER SERIOUS COMORBIDITY PRESENT: ICD-10-CM

## 2021-09-10 DIAGNOSIS — Z11.59 ENCOUNTER FOR HEPATITIS C SCREENING TEST FOR LOW RISK PATIENT: ICD-10-CM

## 2021-09-10 DIAGNOSIS — Z00.00 ANNUAL PHYSICAL EXAM: Primary | ICD-10-CM

## 2021-09-10 DIAGNOSIS — Z13.1 SCREENING FOR DIABETES MELLITUS: ICD-10-CM

## 2021-09-10 DIAGNOSIS — E55.9 VITAMIN D DEFICIENCY: ICD-10-CM

## 2021-09-10 DIAGNOSIS — Z12.5 PROSTATE CANCER SCREENING: ICD-10-CM

## 2021-09-10 DIAGNOSIS — Z13.0 SCREENING FOR DEFICIENCY ANEMIA: ICD-10-CM

## 2021-09-10 PROCEDURE — 99396 PREV VISIT EST AGE 40-64: CPT | Performed by: FAMILY MEDICINE

## 2021-09-10 ASSESSMENT — ENCOUNTER SYMPTOMS
ABDOMINAL PAIN: 0
COLOR CHANGE: 0
DIARRHEA: 0
NAUSEA: 0
CONSTIPATION: 0
TROUBLE SWALLOWING: 0
COUGH: 0
SHORTNESS OF BREATH: 0
VOMITING: 0
BACK PAIN: 0
BLOOD IN STOOL: 0

## 2021-09-10 ASSESSMENT — PATIENT HEALTH QUESTIONNAIRE - PHQ9
SUM OF ALL RESPONSES TO PHQ QUESTIONS 1-9: 0
SUM OF ALL RESPONSES TO PHQ9 QUESTIONS 1 & 2: 0
SUM OF ALL RESPONSES TO PHQ QUESTIONS 1-9: 0
1. LITTLE INTEREST OR PLEASURE IN DOING THINGS: 0
2. FEELING DOWN, DEPRESSED OR HOPELESS: 0
SUM OF ALL RESPONSES TO PHQ QUESTIONS 1-9: 0

## 2021-09-10 NOTE — PATIENT INSTRUCTIONS

## 2021-09-10 NOTE — PROGRESS NOTES
Tiara Justice  YOB: 1980    Date of Service:  9/10/2021    Chief Complaint:   Tiara Justice is a 39 y.o. male who presents for a preventative visit    HPI:    Annual physical    Concerns: none     Previous Colonoscopy: no  BRBR, unexplained WL, bloating, abd pain: No  Family Hx of Colon Ca:  yes    Exercise: staying active with a toddler   Diet: \"eh\"  A lot of chicken, fresh fruits and vegetables    Self-testicular exams: Yes  Sexual activity: has sex with female, wife    Abnormal Sxs: no  Family Hx of prostate Ca: no  PSA testing:yes, today     Smoker: No  AAA screening: no    AWV status: n/a    Wt Readings from Last 3 Encounters:   09/10/21 270 lb 9.6 oz (122.7 kg)   11/30/20 259 lb (117.5 kg)   03/13/20 271 lb (122.9 kg)     BP Readings from Last 3 Encounters:   09/10/21 110/72   11/30/20 115/72   03/13/20 118/78       Patient Active Problem List   Diagnosis    History of alcohol use disorder    Chronic right-sided thoracic back pain    Chronic right-sided low back pain    Class 2 obesity due to excess calories with body mass index (BMI) of 36.0 to 36.9 in adult       Health Maintenance   Topic Date Due    Hepatitis C screen  Never done    Varicella vaccine (1 of 2 - 2-dose childhood series) Never done    A1C test (Diabetic or Prediabetic)  03/13/2021    Flu vaccine (1) 09/01/2021    Lipid screen  03/13/2025    DTaP/Tdap/Td vaccine (2 - Td or Tdap) 03/13/2030    COVID-19 Vaccine  Completed    HIV screen  Completed    Hepatitis A vaccine  Aged Out    Hepatitis B vaccine  Aged Out    Hib vaccine  Aged Out    Meningococcal (ACWY) vaccine  Aged Out    Pneumococcal 0-64 years Vaccine  Aged [employer] History   Administered Date(s) Administered    COVID-19, Moderna, PF, 100mcg/0.5mL 05/05/2021, 06/02/2021    Influenza Virus Vaccine 11/01/2019, 10/20/2020    Influenza, Quadv, IM, PF (6 mo and older Fluzone, Flulaval, Fluarix, and 3 yrs and older Afluria) 09/20/2019, 10/09/2020    Tdap (Boostrix, Adacel) 2020       No Known Allergies  No current outpatient medications on file. No current facility-administered medications for this visit. Past Medical History:   Diagnosis Date    History of chicken pox      Past Surgical History:   Procedure Laterality Date    HERNIA REPAIR  1984     Family History   Problem Relation Age of Onset    Mult Sclerosis Mother         2012    Colon Cancer Maternal Grandmother         62s    Colon Cancer Maternal Grandfather         46s    Cancer Maternal Grandfather         prostate     Colon Cancer Paternal Kaylie Justen         [de-identified]    Colon Cancer Paternal Great Grandfather         90s     Social History     Socioeconomic History    Marital status:      Spouse name: Not on file    Number of children: 1    Years of education: Not on file    Highest education level: Not on file   Occupational History    Not on file   Tobacco Use    Smoking status: Former Smoker     Packs/day: 0.50     Start date:      Quit date:      Years since quittin.6    Smokeless tobacco: Never Used   Vaping Use    Vaping Use: Never used   Substance and Sexual Activity    Alcohol use: Yes     Comment: Rarely 6 pack last 6-7 months    Drug use: Never    Sexual activity: Yes     Partners: Female     Comment: wife    Other Topics Concern    Not on file   Social History Narrative    Not on file     Social Determinants of Health     Financial Resource Strain: Low Risk     Difficulty of Paying Living Expenses: Not very hard   Food Insecurity: No Food Insecurity    Worried About Running Out of Food in the Last Year: Never true    Rachid of Food in the Last Year: Never true   Transportation Needs: No Transportation Needs    Lack of Transportation (Medical): No    Lack of Transportation (Non-Medical):  No   Physical Activity: Inactive    Days of Exercise per Week: 0 days    Minutes of Exercise per Session: 0 min   Stress: No Stress Concern Present    Feeling of Stress : Only a little   Social Connections:     Frequency of Communication with Friends and Family:     Frequency of Social Gatherings with Friends and Family:     Attends Yazdanism Services:     Active Member of Clubs or Organizations:     Attends Club or Organization Meetings:     Marital Status:    Intimate Partner Violence:     Fear of Current or Ex-Partner:     Emotionally Abused:     Physically Abused:     Sexually Abused:        Review of Systems:  Review of Systems   Constitutional: Negative for activity change, appetite change, chills, diaphoresis, fatigue, fever and unexpected weight change. HENT: Negative for ear pain, hearing loss and trouble swallowing. Eyes: Negative for visual disturbance. Respiratory: Negative for cough and shortness of breath. Cardiovascular: Negative for chest pain, palpitations and leg swelling. Gastrointestinal: Negative for abdominal pain, blood in stool, constipation, diarrhea, nausea and vomiting. Genitourinary: Negative for decreased urine volume, difficulty urinating, dysuria, flank pain, hematuria and urgency. Musculoskeletal: Negative for arthralgias and back pain. Skin: Negative for color change and rash. Neurological: Negative for dizziness, weakness, light-headedness, numbness and headaches. Psychiatric/Behavioral: Negative for dysphoric mood and sleep disturbance. The patient is not nervous/anxious. Physical Exam:   Vitals:    09/10/21 1056   BP: 110/72   Site: Right Upper Arm   Position: Sitting   Cuff Size: Large Adult   Pulse: 78   SpO2: 96%   Weight: 270 lb 9.6 oz (122.7 kg)   Height: 5' 10.75\" (1.797 m)     Body mass index is 38.01 kg/m². Physical Exam  Vitals and nursing note reviewed. Constitutional:       General: He is not in acute distress. Appearance: He is well-developed. He is not diaphoretic. HENT:      Head: Normocephalic and atraumatic.       Right Ear: External ear normal.      Left Ear: External ear normal.      Mouth/Throat:      Pharynx: No oropharyngeal exudate. Eyes:      General:         Right eye: No discharge. Left eye: No discharge. Pupils: Pupils are equal, round, and reactive to light. Neck:      Thyroid: No thyromegaly. Cardiovascular:      Rate and Rhythm: Normal rate and regular rhythm. Heart sounds: Normal heart sounds. No murmur heard. No friction rub. No gallop. Pulmonary:      Effort: Pulmonary effort is normal. No respiratory distress. Breath sounds: Normal breath sounds. No wheezing or rales. Abdominal:      General: Bowel sounds are normal. There is no distension. Palpations: Abdomen is soft. There is no mass. Tenderness: There is no abdominal tenderness. There is no guarding. Hernia: No hernia is present. Musculoskeletal:         General: Normal range of motion. Cervical back: Normal range of motion and neck supple. Lymphadenopathy:      Cervical: No cervical adenopathy. Skin:     General: Skin is warm and dry. Findings: No erythema. Neurological:      Mental Status: He is alert. Cranial Nerves: No cranial nerve deficit. Coordination: Coordination normal.   Psychiatric:         Behavior: Behavior normal.         Thought Content: Thought content normal.         Judgment: Judgment normal.         Assessment/Plan:  1. Annual physical exam  Colonoscopy at 39, discussed  - Comprehensive Metabolic Panel; Future  - CBC Auto Differential; Future  - Hemoglobin A1C; Future  - Hepatitis C Antibody; Future  - Lipid Panel; Future  - Vitamin D 25 Hydroxy; Future  - TSH with Reflex; Future  - Psa screening; Future    2. Prostate cancer screening  - Psa screening; Future    3. Vitamin D deficiency  - Vitamin D 25 Hydroxy; Future    4. Encounter for hepatitis C screening test for low risk patient  - Hepatitis C Antibody; Future    5.  Screening for deficiency anemia  - CBC Auto Differential; Future    6. Screening for thyroid disorder  - TSH with Reflex; Future    7. Screening for diabetes mellitus  - Hemoglobin A1C; Future    Nutrition discussed in detail. Healthy content, appropriate portion control, and guidelines for daily exercise (min 30 mins daily of moderate cardio exercise x5 days/week and work up) all heavily discussed in detail. Recommendations made based on pt's health history and lifestyle. While assessing care for this patient, I have reviewed all pertinent lab work/imaging/ specialist notes and care in reference to those problems addressed above in detail. Appropriate medical decision making was based on this. Please note that portions of this note may have been completed with a voice recognition program. Efforts were made to edit the dictations but occasionally words are mis-transcribed. No follow-ups on file.     Dr. Marla Mchugh MD/MS  9/10/21

## 2022-08-25 ENCOUNTER — NURSE TRIAGE (OUTPATIENT)
Dept: OTHER | Facility: CLINIC | Age: 42
End: 2022-08-25

## 2022-08-25 NOTE — TELEPHONE ENCOUNTER
Received call from Javi Hannah at [facility] with [employer] Complaint. Subjective: Caller states \"I'm having left shoulder pain radiating down my arm. \"     Current Symptoms: left shoulder pain    Onset: several years ago; worsening last few days    Associated Symptoms: radiating pain down elbow, numbness down elbow    Pain Severity: 3/10; aching; Constant    Temperature: Denies    What has been tried: N/A    Recommended disposition: See in Office Today or Tomorrow    Care advice provided, patient verbalizes understanding; denies any other questions or concerns; instructed to call back for any new or worsening symptoms. Patient/Caller agrees with recommended disposition; writer provided warm transfer to Marielena Marie at [facility] for appointment scheduling     Attention Provider: Thank you for allowing me to participate in the care of your patient. The patient was connected to triage in response to information provided to the ECC/PSC. Please do not respond through this encounter as the response is not directed to a shared pool.     Reason for Disposition   Patient wants to be seen    Protocols used: Shoulder Pain-ADULT-[place]

## 2022-08-26 ENCOUNTER — HOSPITAL ENCOUNTER (OUTPATIENT)
Dept: GENERAL RADIOLOGY | Age: 42
Discharge: HOME | End: 2022-08-26
Payer: COMMERCIAL

## 2022-08-26 ENCOUNTER — HOSPITAL ENCOUNTER (OUTPATIENT)
Age: 42
Discharge: HOME | End: 2022-08-26
Payer: COMMERCIAL

## 2022-08-26 ENCOUNTER — OFFICE VISIT (OUTPATIENT)
Dept: FAMILY MEDICINE CLINIC | Age: 42
End: 2022-08-26
Payer: COMMERCIAL

## 2022-08-26 VITALS
HEART RATE: 73 BPM | OXYGEN SATURATION: 96 % | BODY MASS INDEX: 35.65 KG/M2 | WEIGHT: 269 LBS | RESPIRATION RATE: 18 BRPM | DIASTOLIC BLOOD PRESSURE: 74 MMHG | HEIGHT: 73 IN | SYSTOLIC BLOOD PRESSURE: 134 MMHG

## 2022-08-26 DIAGNOSIS — M54.10 RADICULOPATHY AFFECTING UPPER EXTREMITY: ICD-10-CM

## 2022-08-26 DIAGNOSIS — R73.01 IFG (IMPAIRED FASTING GLUCOSE): ICD-10-CM

## 2022-08-26 DIAGNOSIS — M25.512 CHRONIC LEFT SHOULDER PAIN: ICD-10-CM

## 2022-08-26 DIAGNOSIS — Z00.00 PREVENTATIVE HEALTH CARE: Primary | ICD-10-CM

## 2022-08-26 DIAGNOSIS — G89.29 CHRONIC LEFT SHOULDER PAIN: ICD-10-CM

## 2022-08-26 DIAGNOSIS — E78.2 MIXED HYPERLIPIDEMIA: ICD-10-CM

## 2022-08-26 LAB
A/G RATIO: 1.8 (ref 1.1–2.2)
ALBUMIN SERPL-MCNC: 4.8 G/DL (ref 3.4–5)
ALP BLD-CCNC: 57 U/L (ref 40–129)
ALT SERPL-CCNC: 37 U/L (ref 10–40)
ANION GAP SERPL CALCULATED.3IONS-SCNC: 9 MMOL/L (ref 3–16)
AST SERPL-CCNC: 22 U/L (ref 15–37)
BILIRUB SERPL-MCNC: 1.1 MG/DL (ref 0–1)
BUN BLDV-MCNC: 15 MG/DL (ref 7–20)
CALCIUM SERPL-MCNC: 9.3 MG/DL (ref 8.3–10.6)
CHLORIDE BLD-SCNC: 104 MMOL/L (ref 99–110)
CHOLESTEROL, TOTAL: 90 MG/DL (ref 0–199)
CO2: 27 MMOL/L (ref 21–32)
CREAT SERPL-MCNC: 0.8 MG/DL (ref 0.9–1.3)
GFR AFRICAN AMERICAN: >60
GFR NON-AFRICAN AMERICAN: >60
GLUCOSE BLD-MCNC: 111 MG/DL (ref 70–99)
HDLC SERPL-MCNC: 34 MG/DL (ref 40–60)
LDL CHOLESTEROL CALCULATED: 28 MG/DL
POTASSIUM REFLEX MAGNESIUM: 4.6 MMOL/L (ref 3.5–5.1)
SODIUM BLD-SCNC: 140 MMOL/L (ref 136–145)
TOTAL PROTEIN: 7.5 G/DL (ref 6.4–8.2)
TRIGL SERPL-MCNC: 140 MG/DL (ref 0–150)
VLDLC SERPL CALC-MCNC: 28 MG/DL

## 2022-08-26 PROCEDURE — 72040 X-RAY EXAM NECK SPINE 2-3 VW: CPT

## 2022-08-26 PROCEDURE — 99396 PREV VISIT EST AGE 40-64: CPT | Performed by: NURSE PRACTITIONER

## 2022-08-26 RX ORDER — PREDNISONE 10 MG/1
TABLET ORAL
Qty: 21 TABLET | Refills: 0 | Status: SHIPPED | OUTPATIENT
Start: 2022-08-26 | End: 2022-09-16

## 2022-08-26 RX ORDER — METHOCARBAMOL 500 MG/1
500 TABLET, FILM COATED ORAL 3 TIMES DAILY
Qty: 30 TABLET | Refills: 0 | Status: SHIPPED | OUTPATIENT
Start: 2022-08-26 | End: 2022-09-05

## 2022-08-26 ASSESSMENT — PATIENT HEALTH QUESTIONNAIRE - PHQ9
SUM OF ALL RESPONSES TO PHQ QUESTIONS 1-9: 0
SUM OF ALL RESPONSES TO PHQ QUESTIONS 1-9: 0
2. FEELING DOWN, DEPRESSED OR HOPELESS: 0
SUM OF ALL RESPONSES TO PHQ QUESTIONS 1-9: 0
SUM OF ALL RESPONSES TO PHQ9 QUESTIONS 1 & 2: 0
1. LITTLE INTEREST OR PLEASURE IN DOING THINGS: 0
SUM OF ALL RESPONSES TO PHQ QUESTIONS 1-9: 0

## 2022-08-26 NOTE — PROGRESS NOTES
Elena Workman (:  1980) is a 43 y.o. male,Established patient, here for evaluation of the following chief complaint(s):  Shoulder Pain (L shoulder pain on and off for years, weakness and mobility x couple days. )         ASSESSMENT/PLAN:  1. Radiculopathy affecting upper extremity  -     XR CERVICAL SPINE (2-3 VIEWS); Future  -     predniSONE (DELTASONE) 10 MG tablet; 2 tablets 2 times daily for 3 days, 1 tablet 2 times daily for 3 days, 1 tablet daily. , Disp-21 tablet, R-0Normal  -     methocarbamol (ROBAXIN) 500 MG tablet; Take 1 tablet by mouth 3 times daily for 10 days, Disp-30 tablet, R-0Normal  2. Chronic left shoulder pain  3. Preventative health care  -     Comprehensive Metabolic Panel w/ Reflex to MG  -     Lipid Panel  4. IFG (impaired fasting glucose)  -     Hemoglobin A1C  5. Mixed hyperlipidemia  -     Comprehensive Metabolic Panel w/ Reflex to MG  -     Lipid Panel  Apply ice pack 4 times daily for 15-20 minutes. Wrap in towel, etc to avoid the coldness directly to the skin. Return if no improvement over the next 5 days or worsens. Discussed importance of follow up evaluation. If no improvement will need further evaluation. Verbalized understanding. No follow-ups on file. Subjective   SUBJECTIVE/OBJECTIVE:  HPI    Left shoulder pain off and on for several years. Now occurring more often.  started with numbness tingling from shoulder to fingers 3-5 accompanied with weakness. Grabbing lap top bag not able to pull up. Feels grasp is intact. No change with altering position. In shower this morning had decent movement. Later in the evening hard to get arm up to hair when shampooing. Review of Systems   All other systems reviewed and are negative. Objective   Physical Exam  Constitutional:       Appearance: Normal appearance. Cardiovascular:      Pulses: Normal pulses. Musculoskeletal:         General: No swelling. Normal range of motion.       Comments: 2+ reflexes brachial equal. Left arm with notable weakness to resistance with flexion and extension. Hand grasp equal and strong. Neurological:      Mental Status: He is alert.             Vitals:    22 1034   BP: 134/74   Site: Right Upper Arm   Position: Sitting   Cuff Size: Large Adult   Pulse: 73   Resp: 18   SpO2: 96%   Weight: 269 lb (122 kg)   Height: 6' 1\" (1.854 m)     Family History   Problem Relation Age of Onset    Mult Sclerosis Mother         2012    No Known Problems Father     No Known Problems Sister     No Known Problems Sister     No Known Problems Sister     Colon Cancer Maternal Grandmother         62s    Colon Cancer Maternal Grandfather         46s    Cancer Maternal Grandfather         prostate     Colon Cancer Paternal Cresenciano Spurr Grandfather         90s    Colon Cancer Paternal Great Grandmother         [de-identified]     Social History     Socioeconomic History    Marital status:      Spouse name: Not on file    Number of children: 1    Years of education: Not on file    Highest education level: Not on file   Occupational History    Not on file   Tobacco Use    Smoking status: Former     Packs/day: 0.50     Types: Cigarettes     Start date:      Quit date:      Years since quittin.6    Smokeless tobacco: Never   Vaping Use    Vaping Use: Never used   Substance and Sexual Activity    Alcohol use: Yes     Comment: Rarely    Drug use: Never    Sexual activity: Yes     Partners: Female     Comment: wife    Other Topics Concern    Not on file   Social History Narrative    Not on file     Social Determinants of Health     Financial Resource Strain: Not on file   Food Insecurity: Not on file   Transportation Needs: Not on file   Physical Activity: Not on file   Stress: Not on file   Social Connections: Not on file   Intimate Partner Violence: Not on file   Housing Stability: Not on file     Past Medical History:   Diagnosis Date    History of chicken pox                An electronic signature was used to authenticate this note.    --CHARLY MOBLEY, KIERA - CNP

## 2022-08-27 LAB
ESTIMATED AVERAGE GLUCOSE: 125.5 MG/DL
HBA1C MFR BLD: 6 %

## 2022-09-01 ENCOUNTER — TELEPHONE (OUTPATIENT)
Dept: FAMILY MEDICINE CLINIC | Age: 42
End: 2022-09-01

## 2022-09-01 DIAGNOSIS — M25.519 CHRONIC SHOULDER PAIN, UNSPECIFIED LATERALITY: Primary | ICD-10-CM

## 2022-09-01 DIAGNOSIS — G89.29 CHRONIC SHOULDER PAIN, UNSPECIFIED LATERALITY: Primary | ICD-10-CM

## 2022-09-01 NOTE — TELEPHONE ENCOUNTER
Would rec'd seeing Ortho if no improvement after oral steroids, although I did not see him for this. Will place. Please let him know.

## 2022-09-01 NOTE — TELEPHONE ENCOUNTER
Pt returned phone call and has been advised of the referral information to call and sched an appt at this time

## 2022-09-01 NOTE — TELEPHONE ENCOUNTER
Per encounter from 8/26/22:    Return if no improvement over the next 5 days or worsens. Discussed importance of follow up evaluation. If no improvement will need further evaluation. Verbalized understanding. Does patient need appointment with PCP, or suggest referral? Please advise.

## 2022-09-01 NOTE — TELEPHONE ENCOUNTER
----- Message from Katerine Rodriguez sent at 8/31/2022  9:50 AM EDT -----  Subject: Appointment Request    Reason for Call: Established Patient Appointment needed: Routine Existing   Condition Follow Up    QUESTIONS    Reason for appointment request? No appointments available during search     Additional Information for Provider? Patient saw Pramod Lopes on 8/26 for the   shoulder pain. He has taken the prednisone, and there is still no   improvement. He is still not having much mobility, and would like to know   what he should do next. If he needs to come in for a follow up, he does   has Friday's off of work. He is requesting a call back.    ---------------------------------------------------------------------------  --------------  Diana KAYE  [phone]; OK to leave message on voicemail  ---------------------------------------------------------------------------  --------------  SCRIPT ANSWERS  COVID Screen: Mariposa Jeff

## 2022-09-16 ENCOUNTER — OFFICE VISIT (OUTPATIENT)
Dept: ORTHOPEDIC SURGERY | Age: 42
End: 2022-09-16
Payer: COMMERCIAL

## 2022-09-16 VITALS — WEIGHT: 269 LBS | BODY MASS INDEX: 35.65 KG/M2 | HEIGHT: 73 IN

## 2022-09-16 DIAGNOSIS — M25.512 LEFT SHOULDER PAIN, UNSPECIFIED CHRONICITY: Primary | ICD-10-CM

## 2022-09-16 PROCEDURE — 99203 OFFICE O/P NEW LOW 30 MIN: CPT | Performed by: STUDENT IN AN ORGANIZED HEALTH CARE EDUCATION/TRAINING PROGRAM

## 2022-09-16 NOTE — PROGRESS NOTES
Date:  2022    Name:  Ike Cullen  Address:  [address] [place] [address] Dr. Richa Mac [address]    :  1980      Age:   43 y.o.    SSN:  [id number]      Medical Record Number:  [id number]    Reason for Visit:    Chief Complaint    Shoulder Pain (NP Lt shoulder)      DOS:2022     HPI: Ike Cullen is a 43 y.o. male here today for new patient evaluation regarding his left shoulder. He is right-hand dominant. He is referred to me by Dr. Bhanu Ronquillo. The patient works in [employer]. About 3 weeks ago he was lifting roughly 50 pounds forward and away from his body when he felt a pop in his left shoulder. Since then he has noted profound weakness and difficulty with motion. He is also reporting some on and off numbness into his left hand and fingers. He denies these issues prior to this injury. He is previously healthy without any major medical issues. He does not smoke. Pain Assessment  Location of Pain: Shoulder  Location Modifiers: Left  Quality of Pain: Dull, Buckling  Duration of Pain: Persistent  Frequency of Pain: Constant  Limiting Behavior: Yes  Relieving Factors: Other (Comment) (icy hot)  Result of Injury: No  Work-Related Injury: No  Are there other pain locations you wish to document?: No  ROS: All systems reviewed on patient intake form. Pertinent items are noted in HPI.         Past Medical History:   Diagnosis Date    History of chicken pox         Past Surgical History:   Procedure Laterality Date    HERNIA REPAIR         Family History   Problem Relation Age of Onset    Mult Sclerosis Mother             No Known Problems Father     No Known Problems Sister     No Known Problems Sister     No Known Problems Sister     Colon Cancer Maternal Grandmother         62s    Colon Cancer Maternal Grandfather         46s    Cancer Maternal Grandfather         prostate     Colon Cancer Paternal [place] Grandfather         90s    Colon Cancer Paternal Great Grandmother         67D Social History     Socioeconomic History    Marital status:     Number of children: 1   Tobacco Use    Smoking status: Former     Packs/day: 0.50     Types: Cigarettes     Start date:      Quit date: 2017     Years since quittin.7    Smokeless tobacco: Never   Vaping Use    Vaping Use: Never used   Substance and Sexual Activity    Alcohol use: Yes     Comment: Rarely    Drug use: Never    Sexual activity: Yes     Partners: Female     Comment: wife        No current outpatient medications on file. No current facility-administered medications for this visit. No Known Allergies    Vital signs:  Ht 6' 1\" (1.854 m)   Wt 269 lb (122 kg)   BMI 35.49 kg/m²      Left shoulder exam    Inspection:  Held in a normal posture. Normal contour at the acromioclavicular joint. No swelling, ecchymosis, or erythema about the shoulder. No atrophy appreciated. No scapular winging. Palpation:  No subacromial crepitus. Very to the [facility] joint and anterior shoulder in the bicipital groove. Range of Motion: Full passive and active ROM. Normal scapulothoracic rhythm. Strength: 3 out of 5 supraspinatus on empty can and champagne toast, 2 out of 5 infraspinatus, 3 out of 5 belly press subscapularis and bearhug. Stability: No anterior instability. No posterior instability. Special Tests: Impingement findings are negative. Labral findings are negative. Speed sign positive    Other findings: The skin is warm dry and well perfused. 2+ radial pulse. Sensation is intact to light touch over the deltoid. Right comparison shoulder exam    Inspection:  Held in a normal posture. Normal contour at the acromioclavicular joint. No swelling, ecchymosis, or erythema about the shoulder. No atrophy appreciated. No scapular winging. Palpation:  No subacromial crepitus. No tenderness of the AC joint. No greater tuberosity tenderness. No tenderness in the bicipital groove.     Range of Motion: Full passive and active ROM. Normal scapulothoracic rhythm. Strength:  Normal supraspinatus, infraspinatus, and subscapularis muscle strength. Stability: No anterior instability. No posterior instability. Special Tests: Impingement findings are negative. Labral findings are negative. Speed sign and Yergason signs are both negative. Crossover sign is negative. Belly press sign is negative. Lift off sign is negative. Other findings: The skin is warm dry and well perfused. 2+ radial pulse. Sensation is intact to light touch over the deltoid. Diagnostics:  Radiology:       XR Left Shoulder Findings:     Views:  3 views L shoulder  Weight bearing: No  Findings: 3 views of the L shoulder taken in the office today and interpreted by me demonstrate no acute osseous abnormalities. Well-maintained joint space to the glenohumeral joint and acromioclavicular joint. Concentric alignment of the glenohumeral joint. No fractures, dislocations, or radio-opaque foreign bodies noted. Appropriate humeral head height with normal acromiohumeral interval. Lung fields demonstrate no apparent masses or calcifications. Previous comparison films: None    Impression:  No acute osseous abnormalities Left shoulder      Assessment: 43 y.o. male with left shoulder injury 3 weeks ago, profound weakness concern for full-thickness rotator cuff tear    Plan: Pertinent imaging was reviewed. The etiology, natural history, and treatment options for the disorder were discussed. The roles of activity medication, antiinflammatories, injections, bracing, physical therapy, and surgical interventions were all described to the patient and questions were answered. Unfortunately I believe the patient sustained a full-thickness rotator cuff tear to his left shoulder. He has profound weakness to supraspinatus, infraspinatus and subscapularis compared to his opposite side.   He is relatively young and healthy so delaying the MRI to treat nonoperatively could result in profound future consequences to the health of his shoulder. Therefore I will get an MRI to evaluate for the full-thickness rotator cuff tear. Follow-up with me after the MRI to go over the results and discuss treatment options. Patti Izaguirre is in agreement with this plan. All questions were answered to patient's satisfaction and was encouraged to call with any further questions. Rhea Love,   Orthopedic Surgery and Sports Medicine  9/16/2022    Orders Placed This Encounter   Procedures    XR SHOULDER LEFT (MIN 2 VIEWS)     Standing Status:   Future     Number of Occurrences:   1     Standing Expiration Date:   9/14/2023     Order Specific Question:   Reason for exam:     Answer:   left shoulder pain    MRI SHOULDER LEFT WO CONTRAST     Standing Status:   Future     Standing Expiration Date:   10/16/2022     Scheduling Instructions:      [email or web address]      [address] [place], [place]            [place] [address]:      TIME AND DATE TBD      PLEASE CALL PATIENT ONCE APPROVED TO SCHEDULE       PUSH TO [employer] PACS SYSTEM            Remember that it may take several business days to pre-cert your MRI through your insurance. Our office will contact you as soon as we have the approval. We will not give any test results over the phone. Please call [phone] once you have your test day and time to schedule a follow up with Dr. Toribio January.      Order Specific Question:   Reason for exam:     Answer:   rule out L shoulder full thickness rotator cuff tear

## 2022-09-16 NOTE — LETTER
[address] [place] [id number]  Phone: [phone]  Fax: [phone]    Roshan Giron DO    September 16, 2022     [facility] [address]    Patient: Smitha Interiano   MR Number: [id number]   YOB: 1980   Date of Visit: 9/16/2022       Dear Bev Norris: Thank you for referring Smitha Interiano to me for evaluation/treatment. Below are the relevant portions of my assessment and plan of care. Thank you for the referral, I believe he has a full-thickness rotator cuff tear to the shoulder. Getting an MRI to evaluate. If you have questions, please do not hesitate to call me. I look forward to following Rosas Workman along with you.     Sincerely,      Roshan Giron DO

## 2022-09-19 ENCOUNTER — TELEPHONE (OUTPATIENT)
Dept: ORTHOPEDIC SURGERY | Age: 42
End: 2022-09-19

## 2022-09-20 NOTE — TELEPHONE ENCOUNTER
Patient called and informed that MRI was approved and Fidel Zamorano will give patient a call to schedule appt.

## 2022-10-14 ENCOUNTER — OFFICE VISIT (OUTPATIENT)
Dept: ORTHOPEDIC SURGERY | Age: 42
End: 2022-10-14
Payer: COMMERCIAL

## 2022-10-14 VITALS — WEIGHT: 269 LBS | BODY MASS INDEX: 35.65 KG/M2 | HEIGHT: 73 IN

## 2022-10-14 DIAGNOSIS — M75.22 BICEPS TENDINITIS OF LEFT SHOULDER: Primary | ICD-10-CM

## 2022-10-14 PROCEDURE — 20610 DRAIN/INJ JOINT/BURSA W/O US: CPT | Performed by: STUDENT IN AN ORGANIZED HEALTH CARE EDUCATION/TRAINING PROGRAM

## 2022-10-14 RX ORDER — TRIAMCINOLONE ACETONIDE 40 MG/ML
80 INJECTION, SUSPENSION INTRA-ARTICULAR; INTRAMUSCULAR ONCE
Status: COMPLETED | OUTPATIENT
Start: 2022-10-14 | End: 2022-10-14

## 2022-10-14 RX ORDER — LIDOCAINE HYDROCHLORIDE 10 MG/ML
4 INJECTION, SOLUTION INFILTRATION; PERINEURAL ONCE
Status: COMPLETED | OUTPATIENT
Start: 2022-10-14 | End: 2022-10-14

## 2022-10-14 RX ADMIN — LIDOCAINE HYDROCHLORIDE 4 ML: 10 INJECTION, SOLUTION INFILTRATION; PERINEURAL at 15:10

## 2022-10-14 RX ADMIN — TRIAMCINOLONE ACETONIDE 80 MG: 40 INJECTION, SUSPENSION INTRA-ARTICULAR; INTRAMUSCULAR at 15:11

## 2022-10-14 NOTE — PROGRESS NOTES
Chief Complaint  Shoulder Pain (FU TR MRI Lt shoulder)      History of Present Illness:  Daniel Mcdonough is a pleasant 43 y.o. male here today for repeat evaluation of his left shoulder. He underwent an MRI. He reports some improvement in his range of motion but still has significant weakness to his shoulder. Prior HPI 9/16/22:  Daniel Mcdonough is a 43 y.o. male here today for new patient evaluation regarding his left shoulder. He is right-hand dominant. He is referred to me by Dr. Bhupinder Buenrostro. The patient works in manufacturing. About 3 weeks ago he was lifting roughly 50 pounds forward and away from his body when he felt a pop in his left shoulder. Since then he has noted profound weakness and difficulty with motion. He is also reporting some on and off numbness into his left hand and fingers. He denies these issues prior to this injury. He is previously healthy without any major medical issues. He does not smoke. Medical History:  Patient's medications, allergies, past medical, surgical, social and family histories were reviewed and updated as appropriate. Pertinent items are noted in HPI  Review of systems reviewed from Patient History Form available in the patient's chart under the Media tab. Vital Signs: There were no vitals filed for this visit. Constitutional: In no apparent distress. Normal affect. Alert and oriented X3 and is cooperative. Left shoulder exam    Inspection:  Held in a normal posture. Normal contour at the acromioclavicular joint. No swelling, ecchymosis, or erythema about the shoulder. No atrophy appreciated. No scapular winging. Palpation:  No subacromial crepitus. No tenderness of the AC joint. No greater tuberosity tenderness. Very tender to the bicipital groove. Range of Motion: Full passive and active ROM. Normal scapulothoracic rhythm.     Strength: Rotator cuff strength in all planes up to 4 for supraspinatus and infraspinatus as well as subscapularis. Stability: No anterior instability. No posterior instability. Special Tests: Impingement findings are negative. Labral findings are negative. Speed sign and Yergason signs are both negative. Crossover sign is negative. Belly press sign is negative. Lift off sign is negative. Other findings: The skin is warm dry and well perfused. 2+ radial pulse. Sensation is intact to light touch over the deltoid. Radiology:       New x-rays today. MRI results demonstrate a large amount of swelling around the biceps tendon in the bicipital groove. Some mild swelling around the rotator cuff but no obvious rotator cuff tearing noted. Assessment : 80-year-old male with left shoulder symptomatic biceps tendinitis    Impression:  Encounter Diagnosis   Name Primary? Biceps tendinitis of left shoulder Yes       Office Procedures:  Orders Placed This Encounter   Procedures    [facility] Physical [facility]     Referral Priority:   Routine     Referral Type:   Eval and Treat     Referral Reason:   Specialty Services Required     Requested Specialty:   Physical Therapist     Number of Visits Requested:   1    [place] ARTHROCENTESIS ASPIR&/INJ MAJOR JT/BURSA W/O US           Plan:     Based on the patient's MRI findings and his physical exam improvement, I believe the patient had either a partial tear or aggravation of his biceps tendon resulting in the weakness that he had. He is improving. His MRI demonstrates no rotator cuff tearing. Therefore I will do a glenohumeral injection today and get him set up with physical therapy for rotator cuff protocol as well as biceps protocol. Follow-up with me in 6 weeks to check his improvement. If he does not get much better with this therapy he may be a candidate for shoulder arthroscopy with subpectoral biceps tenodesis.       The indications and risks of steroid injection as well as treatment alternatives were discussed with the patient who consented to the procedure. Under sterile conditions and after informed consent was obtained, using posterior approach the patient Kenalog and 4 mL of 1% lidocaine were placed in the shoulder after it was prepped with chlorhexidine. This resulted in good relief of symptoms. There were no complications. The patient was advised to ice the shoulder this evening and to avoid vigorous activities for the next 2 days. They were advised to call us if there was any erythema, enduration, swelling or increasing pain. Sergio Rudi is in agreement with this plan. All questions were answered to patient's satisfaction and was encouraged to call with any further questions. Liliam Donaldson, DO  Orthopedic Surgery and Sports Medicine  10/14/2022      This dictation was performed with a verbal recognition program [facility]) and it was checked for errors. It is possible that there are still dictated errors within this office note. If so, please bring any errors to my attention for an addendum. All efforts were made to ensure that this office note is accurate.

## 2022-10-28 ENCOUNTER — HOSPITAL ENCOUNTER (OUTPATIENT)
Dept: PHYSICAL THERAPY | Age: 42
Setting detail: THERAPIES SERIES
Discharge: HOME | End: 2022-10-28
Payer: COMMERCIAL

## 2022-10-28 PROCEDURE — 97161 PT EVAL LOW COMPLEX 20 MIN: CPT

## 2022-10-28 PROCEDURE — 97112 NEUROMUSCULAR REEDUCATION: CPT

## 2022-10-28 PROCEDURE — 97110 THERAPEUTIC EXERCISES: CPT

## 2022-10-28 NOTE — FLOWSHEET NOTE
[address] [place] [address] and Sports Rehabilitation[address], [address] [place] [address] [address]  Phone: [phone]   Fax:     [phone]      Physical Therapy Treatment Note/ Progress Report:           Date:  10/28/2022    Patient Name:  Nelly Ramos    :  1980  MRN: [id number]  Restrictions/Precautions:    Medical/Treatment Diagnosis Information:  Diagnosis: M75.22 (ICD-10-CM) - Biceps tendinitis of left shoulder  Treatment Diagnosis: L shoulder pain  Insurance/Certification information: BCBS CP$[id number] BMN NO AUTH  Physician Information: Samuel Purvis  Has the plan of care been signed (Y/N):        []  Yes  [x]  No     Date of Patient follow up with Physician:    Assessment Summary: Stefania Whipple is a 43 y.o. male reporting to OP PT with c/c of L shoulder weakness, loss of ROM and pain which has been occurring since 3 month ago without known ROBERT. Objective measurements revealed decreased shoulder ROM, mobility, strength, increased tone and tenderness. Pt demonstrates negative Neer, Samson, Speed and apprehension test. Symptoms presentation is consistent with repetitive strain and flexor tendinopathy. Possible causative factors are muscle tightness and joint stiffness. The deficits listed above limiting pt ability to participate work and social activities which negatively impact pt's overall QoL.           Date Range for reporting period:  Beginning:   10/28/22   Endin      Recertification will be due (POC Duration  / 90 days whichever is less): 23          Visit # Insurance Allowable Auth Required   In Person  No auth []  Yes     []  No    Tele Health   []  Yes     []  No    Total 1         Functional Scale: Quick Dash 7%   Date assessed:     Latex Allergy:  [x]NO      []YES  Preferred Language for Healthcare:   [x]English       []other:      Pain level:  2/10         SUBJECTIVE:  See eval        OBJECTIVE: See eval          RESTRICTIONS/PRECAUTIONS: Exercises/Interventions: HEP code: [id number]  Therapeutic Ex (76641) HEP 10/28     Warm-up       Pulleys       UBE              TABLE       Shoulder [place] x X20 2#     SA punch x X20 2#                                               SEATED                                          STANDING       ER resisted  x X20 BTB     Mid Row x X20 BTB     Towel slide on the wall (flexion/abduction) x X20      Passive shoulder extension with cane x X20                            Manual: Deep friction massage on bicep tendon 5'    Therapeutic Exercise and NMR EXR  [x] (22753) Provided verbal/tactile cueing for activities related to strengthening, flexibility, endurance, ROM  for improvements in scapular, scapulothoracic and UE control with self care, reaching, carrying, lifting, house/yardwork, driving/computer work.    [] (81442) Provided verbal/tactile cueing for activities related to improving balance, coordination, kinesthetic sense, posture, motor skill, proprioception  to assist with  scapular, scapulothoracic and UE control with self care, reaching, carrying, lifting, house/yardwork, driving/computer work. Therapeutic Activities:    [x] (02043 or 62330) Provided verbal/tactile cueing for activities related to improving balance, coordination, kinesthetic sense, posture, motor skill, proprioception and motor activation to allow for proper function of scapular, scapulothoracic and UE control with self care, carrying, lifting, driving/computer work.      Home Exercise Program:    [x] (87246) Reviewed/Progressed HEP activities related to strengthening, flexibility, endurance, ROM of scapular, scapulothoracic and UE control with self care, reaching, carrying, lifting, house/yardwork, driving/computer work  [] (65465) Reviewed/Progressed HEP activities related to improving balance, coordination, kinesthetic sense, posture, motor skill, proprioception of scapular, scapulothoracic and UE control with self care, reaching, carrying, lifting, house/yardwork, driving/computer work      Manual Treatments:  PROM / STM / Oscillations-Mobs:  G-I, II, III, IV (PA's, Inf., Post.)  [x] (04704) Provided manual therapy to mobilize soft tissue/joints of cervical/CT, scapular GHJ and UE for the purpose of modulating pain, promoting relaxation,  increasing ROM, reducing/eliminating soft tissue swelling/inflammation/restriction, improving soft tissue extensibility and allowing for proper ROM for normal function with self care, reaching, carrying, lifting, house/yardwork, driving/computer work    Modalities:     [] GAME READY (VASO)- for significant edema, swelling, pain control. Charges:  Timed Code Treatment Minutes: 38   Total Treatment Minutes:  53   BWC:  TE TIME:  NMR TIME:  MANUAL TIME:  TA  UNTIMED MINUTES:  Medicare Total:   23  15      15          [x] EVAL (LOW) 79293 (typically 20 minutes face-to-face)  [] EVAL (MOD) 28299 (typically 30 minutes face-to-face)  [] EVAL (HIGH) 64385 (typically 45 minutes face-to-face)  [] RE-EVAL     [x] MS(19514) x   2  [] IONTO  [x] NMR (73258) x   1  [] VASO  [] Manual (56429) x    [] Other:  [] TA x      [] Mech Traction (91217)  [] ES(attended) (10640)      [] ES (un) (13629):           GOALS:   GOALS:  Patient stated goal: Be able to lift and work without pain. Therapist goals for Patient:   Short Term Goals: To be achieved in: 2 weeks  1. Independent in HEP and progression per patient tolerance, in order to prevent re-injury. [x] Progressing: [] Met: [] Not Met: [] Adjusted      2. Patient will have a decrease in pain to 1/10 facilitate improvement in movement, function, and ADLs as indicated by Functional Deficits. [x] Progressing: [] Met: [] Not Met: [] Adjusted      Long Term Goals: To be achieved in: 8 weeks  1. Pt will improve quick dash to 1% or more, indicating improved functional capacity . [x] Progressing: [] Met: [] Not Met: [] Adjusted      2.  Patient will demonstrate increased AROM flex/ to 160 to allow for proper joint functioning as indicated by patients Functional Deficits. [x] Progressing: [] Met: [] Not Met: [] Adjusted      3. Patient will demonstrate an increase in Strength of shoulder flexion to 4+/5 to allow for proper functional mobility as indicated by patients Functional Deficits. [x] Progressing: [] Met: [] Not Met: [] Adjusted      4. Patient will be able to perform job duties without pain. [x] Progressing: [] Met: [] Not Met: [] Adjusted                ASSESSMENT:  See eval    Patient received education on their current pathology and how their condition effects them with their functional activities. Patient understood discussion and questions were answered. Patient understands their activity limitations and understands rational for treatment progression. Pt educated on plan of care and HEP, if worsening symptoms to d/c that exercise. PLAN: See eval  [x] Continue per plan of care [] Alter current plan (see comments above)  [] Plan of care initiated [] Hold pending MD visit [] Discharge      Electronically signed by:  Isaac Dandy, PT    Note: If patient does not return for scheduled/ recommended follow up visits, this note will serve as a discharge from care along with most recent update on progress.

## 2022-10-28 NOTE — PROGRESS NOTES
[address] [facility], [address], [address] [place] [address] [address]  Phone: [phone]   Fax: [phone]    Date: 10/28/2022          Patient Name; :  Ghazala Bass; 1980   Dx:  M75.22 (ICD-10-CM) - Biceps tendinitis of left shoulder      Physician:  Chandan Davis        Total PT Visits:      Measures Previous Current   Pain (0-10)     FOTO (0-100)  High number is more function           Assessment:        Prognosis for POC: [] Good [] Fair  [] Poor      Patient requires continued skilled intervention: [] Yes  [] No        Plan & Recommendations:  [] Continue rehabilitation due to objective improvement and continued functional deficits with frequency and duration:   [] Progress toward  []GAP, []Work Conditioning, []Independent HEP   [] Discharge due to   [] All goals achieved, [] Maximized \"medical necessity\" [] No subjective or objective improvements      Electronically signed by:  Tommy Leung, PT  Therapy Plan of Care Re-Certification  This patient has been re-evaluated for physical therapy services and for therapy to continue, Medicare, Medicaid and other insurances require periodic physician review of the treatment plan. Please review the above re-evaluation and verify that you agree with plan of care as established above by signing the attached document and return it to our office or note changes to established plan below  [] Follow treatment plan as above [] Discontinue physical therapy  [] Change plan to:                                 __________________________________________________    Physician Signature:____________________________________ Date:____________  By signing above, therapists plan is approved by physician    If you have any questions or concerns, please don't hesitate to call.   Thank you for your referral.

## 2022-10-28 NOTE — PLAN OF CARE
[facility], [address] [facility] [place], [address]  Phone: [phone]   Fax:     [phone]                                                       Physical Therapy Certification    Dear Ynes Hernandez,    We had the pleasure of evaluating the following patient for physical therapy services at [address] [place] [address]. A summary of our findings can be found in the initial assessment below. This includes our plan of care. If you have any questions or concerns regarding these findings, please do not hesitate to contact me at the office phone number checked above. Thank you for the referral.       Physician Signature:_______________________________Date:__________________  By signing above (or electronic signature), therapists plan is approved by physician      Patient: Yady Caballero   : 1980   MRN: [id number]  Referring Physician: Ynes Hernandez      Evaluation Date: 10/28/2022      Medical Diagnosis Information:  Diagnosis: M75.22 (ICD-10-CM) - Biceps tendinitis of left shoulder   Treatment Diagnosis: L shoulder pain                                         Insurance information: BCBS CP$40 BMN NO AUTH    Precautions/ Contra-indications:     Latex Allergy:  [x]NO      []YES    Preferred Language for Healthcare:   [x]English       []other:    SUBJECTIVE: Patient states the L shoulder pain started 3 months. Pt notes the loss of strength in shoulder and elbow flexion and he could's lift the arm above his head. Pt notes the pain is localized in the anterior shoulder, and he reports some numbness radiating to his fingers occasionally. Relevant Medical History:    Pain Scale: Current: 2/10; Max: 3/10;  Best: 2/10    Easing factors: icing, rest    Provocative factors: any overhead movements, lifting    Type: []Constant   [x]Intermittent []Radiating [x]Localized []other:     Numbness/Tingling: Y    Occupation/School: manufacture    Living Status/Prior Level of Function: Independent with ADLs and IADLs. C-SSRS Triggered by Intake questionnaire (Past 2 wk assessment):   [x] No, Questionnaire did not trigger screening.   [] Yes, Patient intake triggered further evaluation      [] C-SSRS Screening completed  [] PCP notified via Plan of Care  [] Emergency services notified     OBJECTIVE:     CERVICAL ROM RIGHT LEFT   Cervical Flexion     Cervical Extension     Cervical Lateral Flexion     Cervical Rotation          ROM RIGHT LEFT   Shoulder Flexion 175 135   Shoulder Abduction 175 130   Shoulder External Rotation 40 40   Shoulder Internal Rotation L2 L2                  Strength  RIGHT LEFT   Shoulder Flexion 4 4-   Shoulder Abduction 4 4-   Shoulder External Rotation 5 5   Shoulder Internal Rotation 5 5        Elbow flexion 5 4   Wrist Extension     Elbow extension 5 5   5th digi abduction            Reflexes/Sensation:    [x]Dermatomes/Myotomes intact    [x]Reflexes equal and normal bilaterally    []Other:    Joint mobility:    [x]Normal    []Hypo   []Hyper    Palpation: TTP over subacromial space and bicipital groove    Functional Mobility/Transfers: Independent    Posture: Forward head, rounded shoulder    Bandages/Dressings/Incisions: NA    Gait: (include devices/WB status): WNL    Orthopedic Special Tests: Speed test, Neer test, Samson test, Anterior apprehension test negative. [x] Patient history, allergies, meds reviewed. Medical chart reviewed. See intake form. Review Of Systems (ROS):  [x]Performed Review of systems (Integumentary, CardioPulmonary, Neurological) by intake and observation. Intake form has been scanned into medical record. Patient has been instructed to contact their primary care physician regarding ROS issues if not already being addressed at this time.       Co-morbidities/Complexities (which will affect course of rehabilitation):   [x]None           Arthritic conditions   []Rheumatoid arthritis (M05.9)  []Osteoarthritis (M19.91)   Cardiovascular conditions   []Hypertension (I10)  []Hyperlipidemia (E78.5)  []Angina pectoris (I20)  []Atherosclerosis (I70)   Musculoskeletal conditions   []Disc pathology   []Congenital spine pathologies   []Prior surgical intervention  []Osteoporosis (M81.8)  []Osteopenia (M85.8)   Endocrine conditions   []Hypothyroid (E03.9)  []Hyperthyroid Gastrointestinal conditions   []Constipation (J81.27)   Metabolic conditions   []Morbid obesity (E66.01)  []Diabetes type 1(E10.65) or 2 (E11.65)   []Neuropathy (G60.9)     Pulmonary conditions   []Asthma (J45)  []Coughing   []COPD (J44.9)   Psychological Disorders  []Anxiety (F41.9)  []Depression (F32.9)   []Other:   []Other:          Barriers to/and or personal factors that will affect rehab potential:              []Age  []Sex              []Motivation/Lack of Motivation                        []Co-Morbidities              []Cognitive Function, education/learning barriers              []Environmental, home barriers              []profession/work barriers  []past PT/medical experience  []other:  Justification: None     Falls Risk Assessment (30 days):   [x] Falls Risk assessed and no intervention required. [] Falls Risk assessed and Patient requires intervention due to being higher risk   TUG score (>12s at risk):     [] Falls education provided, including         Functional Questionnaire: Quick Dash 7%      ASSESSMENT: Luis Miles is a 43 y.o. male reporting to OP PT with c/c of L shoulder weakness, loss of ROM and pain which has been occurring since 3 month ago without known ROBERT. Objective measurements revealed decreased shoulder ROM, mobility, strength, increased tone and tenderness. Pt demonstrates negative Neer, Samson, Speed and apprehension test. Symptoms presentation is consistent with repetitive strain and flexor tendinopathy. Possible causative factors are muscle tightness and joint stiffness. The deficits listed above limiting pt ability to participate work and social activities which negatively impact pt's overall QoL. Functional Impairments   [x]Noted spinal or UE joint hypomobility   []Noted spinal or UE joint hypermobility   [x]Decreased UE functional ROM   [x]Decreased UE functional strength   []Abnormal reflexes/sensation/myotomal/dermatomal deficits   [x]Decreased RC/scapular/core strength and neuromuscular control   []other:      Functional Activity Limitations (from functional questionnaire and intake)   [x]Reduced ability to tolerate prolonged functional positions   [x]Reduced ability or difficulty with changes of positions or transfers between positions   [x]Reduced ability to maintain good posture and demonstrate good body mechanics with sitting, bending, and lifting   [x] Reduced ability or tolerance with driving and/or computer work   []Reduced ability to sleep   [x]Reduced ability to perform lifting, reaching, carrying tasks   [x]Reduced ability to tolerate impact through UE   [x]Reduced ability to reach behind back   [x]Reduced ability to  or hold objects   [x]Reduced ability to throw or toss an object   []other:    Participation Restrictions   []Reduced participation in self care activities   []Reduced participation in home management activities   [x]Reduced participation in work activities   [x]Reduced participation in social activities. []Reduced participation in sport/recreation activities. Classification:   []Signs/symptoms consistent with post-surgical status including decreased ROM, strength and function.   []Signs/symptoms consistent with joint sprain/strain   []Signs/symptoms consistent with shoulder impingement   [x]Signs/symptoms consistent with shoulder/elbow/wrist tendinopathy   []Signs/symptoms consistent with Rotator cuff tear   []Signs/symptoms consistent with labral tear   []Signs/symptoms consistent with postural dysfunction    []Signs/symptoms consistent with Glenohumeral IR Deficit - <45 degrees   []Signs/symptoms consistent with facet dysfunction of cervical/thoracic spine    []Signs/symptoms consistent with pathology which may benefit from Dry needling     []other:     Prognosis/Rehab Potential:      []Excellent   [x]Good    []Fair   []Poor    Tolerance of evaluation/treatment:    []Excellent   [x]Good    []Fair   []Poor    Physical Therapy Evaluation Complexity Justification  [x] A history of present problem with:  [x] no personal factors and/or comorbidities that impact the plan of care;  []1-2 personal factors and/or comorbidities that impact the plan of care  []3 personal factors and/or comorbidities that impact the plan of care  [x] An examination of body systems using standardized tests and measures addressing any of the following: body structures and functions (impairments), activity limitations, and/or participation restrictions;:  [x] a total of 1-2 or more elements   [] a total of 3 or more elements   [] a total of 4 or more elements   [x] A clinical presentation with:  [x] stable and/or uncomplicated characteristics   [] evolving clinical presentation with changing characteristics  [] unstable and unpredictable characteristics;   [x] Clinical decision making of [] low, [] moderate, [] high complexity using standardized patient assessment instrument and/or measurable assessment of functional outcome.     [x] EVAL (LOW) 09609 (typically 20 minutes face-to-face)  [] EVAL (MOD) 69146 (typically 30 minutes face-to-face)  [] EVAL (HIGH) 86546 (typically 45 minutes face-to-face)  [] RE-EVAL     PLAN:  Frequency/Duration:  1 days per week for 8 Weeks:  INTERVENTIONS:  [x] Therapeutic exercise including: strength training, ROM, for Upper extremity and core   [x]  NMR activation and proprioception for UE, scap and Core   [x] Manual therapy as indicated for shoulder, scapula and spine to include: Dry Needling/IASTM, STM, PROM, Gr I-IV mobilizations, manipulation. [x] Modalities as needed that may include: thermal agents, E-stim, Biofeedback, US, iontophoresis as indicated  [x] Patient education on joint protection, postural re-education, activity modification, progression of HEP. HEP instruction: [id number]    GOALS:  Patient stated goal: Be able to lift and work without pain. Therapist goals for Patient:   Short Term Goals: To be achieved in: 2 weeks  1. Independent in HEP and progression per patient tolerance, in order to prevent re-injury. [] Progressing: [] Met: [] Not Met: [] Adjusted     2. Patient will have a decrease in pain to 1/10 facilitate improvement in movement, function, and ADLs as indicated by Functional Deficits. [] Progressing: [] Met: [] Not Met: [] Adjusted     Long Term Goals: To be achieved in: 8 weeks  1. Pt will improve quick dash to 1% or more, indicating improved functional capacity . [] Progressing: [] Met: [] Not Met: [] Adjusted     2. Patient will demonstrate increased AROM flex/ to 160 to allow for proper joint functioning as indicated by patients Functional Deficits. [] Progressing: [] Met: [] Not Met: [] Adjusted     3. Patient will demonstrate an increase in Strength of shoulder flexion to 4+/5 to allow for proper functional mobility as indicated by patients Functional Deficits. [] Progressing: [] Met: [] Not Met: [] Adjusted     4. Patient will be able to perform job duties without pain.     [] Progressing: [] Met: [] Not Met: [] Adjusted      Electronically signed by:  Kash Castro, PT    Dilcia MARCOS

## 2022-11-04 ENCOUNTER — HOSPITAL ENCOUNTER (OUTPATIENT)
Dept: PHYSICAL THERAPY | Age: 42
Setting detail: THERAPIES SERIES
Discharge: HOME | End: 2022-11-04
Payer: COMMERCIAL

## 2022-11-04 PROCEDURE — 97112 NEUROMUSCULAR REEDUCATION: CPT

## 2022-11-04 PROCEDURE — 97110 THERAPEUTIC EXERCISES: CPT

## 2022-11-04 PROCEDURE — 97140 MANUAL THERAPY 1/> REGIONS: CPT

## 2022-11-04 NOTE — FLOWSHEET NOTE
[address] [place] [address] [facility], [address] [place] [address] [address]  Phone: [phone]   Fax:     [phone]      Physical Therapy Treatment Note/ Progress Report:           Date:  2022    Patient Name:  Jermain Curry    :  1980  MRN: [id number]  Restrictions/Precautions:    Medical/Treatment Diagnosis Information:  Diagnosis: M75.22 (ICD-10-CM) - Biceps tendinitis of left shoulder  Treatment Diagnosis: L shoulder pain  Insurance/Certification information: BCBS CP$40 BMN NO AUTH  Physician Information: Alexandra Daniel  Has the plan of care been signed (Y/N):        []  Yes  [x]  No     Date of Patient follow up with Physician:    Assessment Summary: Clint Fonseca is a 43 y.o. male reporting to OP PT with c/c of L shoulder weakness, loss of ROM and pain which has been occurring since 3 month ago without known ROBERT. Objective measurements revealed decreased shoulder ROM, mobility, strength, increased tone and tenderness. Pt demonstrates negative Neer, Samson, Speed and apprehension test. Symptoms presentation is consistent with repetitive strain and flexor tendinopathy. Possible causative factors are muscle tightness and joint stiffness. The deficits listed above limiting pt ability to participate work and social activities which negatively impact pt's overall QoL.           Date Range for reporting period:  Beginning:   10/28/22   Endin      Recertification will be due (POC Duration  / 90 days whichever is less): 23          Visit # Insurance Allowable Auth Required   In Person  No auth []  Yes     []  No    Tele Health   []  Yes     []  No    Total 2         Functional Scale: Quick Dash 7%   Date assessed:     Latex Allergy:  [x]NO      []YES  Preferred Language for Healthcare:   [x]English       []other:      Pain level:  2/10         SUBJECTIVE:  Pt carolynn session         OBJECTIVE:           RESTRICTIONS/PRECAUTIONS: Exercises/Interventions: HEP code: [id number]  Therapeutic Ex (84281) HEP 10/28 11/4/22    Warm-up       Pulleys   2'/2', Lv 5    UBE              TABLE       Shoulder [place] x X20 2# X30 ea, 4#    SA punch x X20 2# X30, 4#    SL Concentric circles   X20 ea, 4#    SL Abduction   X20, 4#                                SEATED                                          STANDING       ER resisted  x X20 BTB X20, BTB    IR x  X20, BlkTB    Extenison x  X20, BTB    Mid Row x X20 BTB X30, BLkTB    [place] x  X20, BTB    Towel slide on the wall (flexion/abduction) x X20      Passive shoulder extension with cane x X20     IR ball behind back   X15 ea, 2#    ER ball behind head   X10 ea, 2#    IR walk aways   x15    Extension walk aways   x15                    Manual: PROM into shoulder flexion, abduction, ER and IR in neutral, 45° and 60° abduction      Therapeutic Exercise and NMR EXR  [x] (22469) Provided verbal/tactile cueing for activities related to strengthening, flexibility, endurance, ROM  for improvements in scapular, scapulothoracic and UE control with self care, reaching, carrying, lifting, house/yardwork, driving/computer work.    [] (95720) Provided verbal/tactile cueing for activities related to improving balance, coordination, kinesthetic sense, posture, motor skill, proprioception  to assist with  scapular, scapulothoracic and UE control with self care, reaching, carrying, lifting, house/yardwork, driving/computer work. Therapeutic Activities:    [x] (06012 or 06857) Provided verbal/tactile cueing for activities related to improving balance, coordination, kinesthetic sense, posture, motor skill, proprioception and motor activation to allow for proper function of scapular, scapulothoracic and UE control with self care, carrying, lifting, driving/computer work.      Home Exercise Program:    [x] (15789) Reviewed/Progressed HEP activities related to strengthening, flexibility, endurance, ROM of scapular, scapulothoracic and UE control with self care, reaching, carrying, lifting, house/yardwork, driving/computer work  [] (55943) Reviewed/Progressed HEP activities related to improving balance, coordination, kinesthetic sense, posture, motor skill, proprioception of scapular, scapulothoracic and UE control with self care, reaching, carrying, lifting, house/yardwork, driving/computer work      Manual Treatments:  PROM / STM / Oscillations-Mobs:  G-I, II, III, IV (PA's, Inf., Post.)  [x] (40268) Provided manual therapy to mobilize soft tissue/joints of cervical/CT, scapular GHJ and UE for the purpose of modulating pain, promoting relaxation,  increasing ROM, reducing/eliminating soft tissue swelling/inflammation/restriction, improving soft tissue extensibility and allowing for proper ROM for normal function with self care, reaching, carrying, lifting, house/yardwork, driving/computer work    Modalities:     [] GAME READY (VASO)- for significant edema, swelling, pain control. Charges:  Timed Code Treatment Minutes: 38   Total Treatment Minutes:  38   BWC:  TE TIME:  NMR TIME:  MANUAL TIME:  TA  UNTIMED MINUTES:  Medicare Total:   17  13  8              [] EVAL (LOW) 90641 (typically 20 minutes face-to-face)  [] EVAL (MOD) 82190 (typically 30 minutes face-to-face)  [] EVAL (HIGH) 91344 (typically 45 minutes face-to-face)  [] RE-EVAL     [x] NM(15815) 1  [] IONTO  [x] NMR (31123) x   1  [] VASO  [x] Manual (62087) 1    [] Other:  [] TA x      [] Mech Traction (24980)  [] ES(attended) (27558)      [] ES (un) (96010):           GOALS:   GOALS:  Patient stated goal: Be able to lift and work without pain. Therapist goals for Patient:   Short Term Goals: To be achieved in: 2 weeks  1. Independent in HEP and progression per patient tolerance, in order to prevent re-injury. [x] Progressing: [] Met: [] Not Met: [] Adjusted      2.  Patient will have a decrease in pain to 1/10 facilitate improvement in movement, function, and ADLs as indicated by Functional Deficits. [x] Progressing: [] Met: [] Not Met: [] Adjusted      Long Term Goals: To be achieved in: 8 weeks  1. Pt will improve quick dash to 1% or more, indicating improved functional capacity . [x] Progressing: [] Met: [] Not Met: [] Adjusted      2. Patient will demonstrate increased AROM flex/ to 160 to allow for proper joint functioning as indicated by patients Functional Deficits. [x] Progressing: [] Met: [] Not Met: [] Adjusted      3. Patient will demonstrate an increase in Strength of shoulder flexion to 4+/5 to allow for proper functional mobility as indicated by patients Functional Deficits. [x] Progressing: [] Met: [] Not Met: [] Adjusted      4. Patient will be able to perform job duties without pain. [x] Progressing: [] Met: [] Not Met: [] Adjusted                ASSESSMENT:  Pt carolynn session well. Shoulder fatigued through session. Patient received education on their current pathology and how their condition effects them with their functional activities. Patient understood discussion and questions were answered. Patient understands their activity limitations and understands rational for treatment progression. Pt educated on plan of care and HEP, if worsening symptoms to d/c that exercise. PLAN: See eval  [x] Continue per plan of care [] Alter current plan (see comments above)  [] Plan of care initiated [] Hold pending MD visit [] Discharge      Electronically signed by:  Hollice Bosworth, PT    Note: If patient does not return for scheduled/ recommended follow up visits, this note will serve as a discharge from care along with most recent update on progress.

## 2022-11-11 ENCOUNTER — HOSPITAL ENCOUNTER (OUTPATIENT)
Dept: PHYSICAL THERAPY | Age: 42
Setting detail: THERAPIES SERIES
Discharge: HOME | End: 2022-11-11
Payer: COMMERCIAL

## 2022-11-11 PROCEDURE — 97112 NEUROMUSCULAR REEDUCATION: CPT

## 2022-11-11 PROCEDURE — 97140 MANUAL THERAPY 1/> REGIONS: CPT

## 2022-11-11 PROCEDURE — 97110 THERAPEUTIC EXERCISES: CPT

## 2022-11-11 NOTE — FLOWSHEET NOTE
[address] [place] [address] and [facility], [address] [place] [address] [address]  Phone: [phone]   Fax:     [phone]      Physical Therapy Treatment Note/ Progress Report:           Date:  2022    Patient Name:  Yris Flores    :  1980  MRN: [id number]  Restrictions/Precautions:    Medical/Treatment Diagnosis Information:  Diagnosis: M75.22 (ICD-10-CM) - Biceps tendinitis of left shoulder  Treatment Diagnosis: L shoulder pain  Insurance/Certification information: BCBS CP$40 BMN NO AUTH  Physician Information: Siddhartha Neves  Has the plan of care been signed (Y/N):        []  Yes  [x]  No     Date of Patient follow up with Physician:    Assessment Summary: Paulina Armas is a 43 y.o. male reporting to OP PT with c/c of L shoulder weakness, loss of ROM and pain which has been occurring since 3 month ago without known ROBERT. Objective measurements revealed decreased shoulder ROM, mobility, strength, increased tone and tenderness. Pt demonstrates negative Neer, Samson, Speed and apprehension test. Symptoms presentation is consistent with repetitive strain and flexor tendinopathy. Possible causative factors are muscle tightness and joint stiffness. The deficits listed above limiting pt ability to participate work and social activities which negatively impact pt's overall QoL.           Date Range for reporting period:  Beginning:   10/28/22   Endin      Recertification will be due (POC Duration  / 90 days whichever is less): 23          Visit # Insurance Allowable Auth Required   In Person  No auth []  Yes     []  No    Tele Health   []  Yes     []  No    Total 3         Functional Scale: Quick Dash 7%   Date assessed:     Latex Allergy:  [x]NO      []YES  Preferred Language for Healthcare:   [x]English       []other:      Pain level:  210         SUBJECTIVE:  Pt states huge improved symptoms and mobility since first visit. OBJECTIVE:           RESTRICTIONS/PRECAUTIONS:     Exercises/Interventions: HEP code: [id number]  Therapeutic Ex (41913) HEP 10/28 11/4/22 11/11/22   Warm-up       Pulleys   2'/2', Lv 5 5'    UBE              TABLE       Shoulder [place] x X20 2# X30 ea, 4# X30 ea, 4#   SA punch x X20 2# X30, 4# X30, 4#   SL Concentric circles   X20 ea, 4# X20 ea, 4#   SL Abduction   X20, 4# X20, 4#   Towel slide flexion/abduction    X20                        SEATED                                          STANDING       ER resisted  x X20 BTB X20, BTB X20 BTB   IR x  X20, BlkTB X20 BLKTB   [place] x  X20, BTB X20 BLKTB   Mid Row x X20 BTB X30, BLkTB X30 XLKTB   [place] x  X20, BTB X20 BLKTB   Towel slide on the wall (flexion/abduction) x X20      Passive shoulder extension with cane x X20     IR ball behind back   X15 ea, 2# X15 ea, 2#   ER ball behind head   X10 ea, 2# X15 ea, 2#   IR walk aways   x15 X15   Extension walk aways   x15 X15                   Manual: PROM into shoulder flexion, abduction, ER and IR in neutral, 45° and 60° abduction, Posterior/Inferior GH glide 10'      Therapeutic Exercise and NMR EXR  [x] (01309) Provided verbal/tactile cueing for activities related to strengthening, flexibility, endurance, ROM  for improvements in scapular, scapulothoracic and UE control with self care, reaching, carrying, lifting, house/yardwork, driving/computer work.    [] (05363) Provided verbal/tactile cueing for activities related to improving balance, coordination, kinesthetic sense, posture, motor skill, proprioception  to assist with  scapular, scapulothoracic and UE control with self care, reaching, carrying, lifting, house/yardwork, driving/computer work.     Therapeutic Activities:    [x] (36032 or 63741) Provided verbal/tactile cueing for activities related to improving balance, coordination, kinesthetic sense, posture, motor skill, proprioception and motor activation to allow for proper function of scapular, scapulothoracic and UE control with self care, carrying, lifting, driving/computer work. Home Exercise Program:    [x] (29085) Reviewed/Progressed HEP activities related to strengthening, flexibility, endurance, ROM of scapular, scapulothoracic and UE control with self care, reaching, carrying, lifting, house/yardwork, driving/computer work  [] (05364) Reviewed/Progressed HEP activities related to improving balance, coordination, kinesthetic sense, posture, motor skill, proprioception of scapular, scapulothoracic and UE control with self care, reaching, carrying, lifting, house/yardwork, driving/computer work      Manual Treatments:  PROM / STM / Oscillations-Mobs:  G-I, II, III, IV (PA's, Inf., Post.)  [x] (96180) Provided manual therapy to mobilize soft tissue/joints of cervical/CT, scapular GHJ and UE for the purpose of modulating pain, promoting relaxation,  increasing ROM, reducing/eliminating soft tissue swelling/inflammation/restriction, improving soft tissue extensibility and allowing for proper ROM for normal function with self care, reaching, carrying, lifting, house/yardwork, driving/computer work    Modalities:     [] GAME READY (VASO)- for significant edema, swelling, pain control. Charges:  Timed Code Treatment Minutes: 40   Total Treatment Minutes:  40   BWC:  TE TIME:  NMR TIME:  MANUAL TIME:  TA  UNTIMED MINUTES:  Medicare Total:   15  15  10              [] EVAL (LOW) 34840 (typically 20 minutes face-to-face)  [] EVAL (MOD) 66330 (typically 30 minutes face-to-face)  [] EVAL (HIGH) 32748 (typically 45 minutes face-to-face)  [] RE-EVAL     [x] SH(32686) 1  [] IONTO  [x] NMR (67240) x   1  [] VASO  [x] Manual (95181) 1    [] Other:  [] TA x      [] Mech Traction (14938)  [] ES(attended) (30185)      [] ES (un) (36718):           GOALS:   GOALS:  Patient stated goal: Be able to lift and work without pain. Therapist goals for Patient:   Short Term Goals:  To be achieved in: 2 weeks  1. Independent in HEP and progression per patient tolerance, in order to prevent re-injury. [x] Progressing: [] Met: [] Not Met: [] Adjusted      2. Patient will have a decrease in pain to 1/10 facilitate improvement in movement, function, and ADLs as indicated by Functional Deficits. [x] Progressing: [] Met: [] Not Met: [] Adjusted      Long Term Goals: To be achieved in: 8 weeks  1. Pt will improve quick dash to 1% or more, indicating improved functional capacity . [x] Progressing: [] Met: [] Not Met: [] Adjusted      2. Patient will demonstrate increased AROM flex/ to 160 to allow for proper joint functioning as indicated by patients Functional Deficits. [x] Progressing: [] Met: [] Not Met: [] Adjusted      3. Patient will demonstrate an increase in Strength of shoulder flexion to 4+/5 to allow for proper functional mobility as indicated by patients Functional Deficits. [x] Progressing: [] Met: [] Not Met: [] Adjusted      4. Patient will be able to perform job duties without pain. [x] Progressing: [] Met: [] Not Met: [] Adjusted                ASSESSMENT:  Pt carolynn session well, added towel slide flexion/abduction ex to improve shoulder mobility. Patient received education on their current pathology and how their condition effects them with their functional activities. Patient understood discussion and questions were answered. Patient understands their activity limitations and understands rational for treatment progression. Pt educated on plan of care and HEP, if worsening symptoms to d/c that exercise. PLAN: See eval  [x] Continue per plan of care [] Alter current plan (see comments above)  [] Plan of care initiated [] Hold pending MD visit [] Discharge      Electronically signed by:  Marcel Campos PT    Note: If patient does not return for scheduled/ recommended follow up visits, this note will serve as a discharge from care along with most recent update on progress.

## 2023-01-20 ENCOUNTER — TELEMEDICINE (OUTPATIENT)
Dept: PRIMARY CARE CLINIC | Age: 43
End: 2023-01-20
Payer: COMMERCIAL

## 2023-01-20 DIAGNOSIS — J06.9 VIRAL URI WITH COUGH: Primary | ICD-10-CM

## 2023-01-20 PROCEDURE — 99213 OFFICE O/P EST LOW 20 MIN: CPT | Performed by: NURSE PRACTITIONER

## 2023-01-20 RX ORDER — BENZONATATE 200 MG/1
200 CAPSULE ORAL 3 TIMES DAILY PRN
Qty: 30 CAPSULE | Refills: 0 | Status: SHIPPED | OUTPATIENT
Start: 2023-01-20 | End: 2023-01-30

## 2023-01-20 RX ORDER — DEXTROMETHORPHAN HYDROBROMIDE AND PROMETHAZINE HYDROCHLORIDE 15; 6.25 MG/5ML; MG/5ML
5 SYRUP ORAL NIGHTLY PRN
Qty: 100 ML | Refills: 0 | Status: SHIPPED | OUTPATIENT
Start: 2023-01-20

## 2023-01-20 RX ORDER — SOD CHLOR,BICARB/SQUEEZ BOTTLE
1 PACKET, WITH RINSE DEVICE NASAL 2 TIMES DAILY PRN
Qty: 1 EACH | Refills: 0 | Status: SHIPPED | OUTPATIENT
Start: 2023-01-20 | End: 2023-02-19

## 2023-01-20 ASSESSMENT — ENCOUNTER SYMPTOMS
WHEEZING: 1
SHORTNESS OF BREATH: 0
RHINORRHEA: 1
SORE THROAT: 1
COUGH: 1

## 2023-01-20 NOTE — LETTER
I had the pleasure of seeing Christiano Berry today for a primary care Virtualist video visit. Our team would love your overall feedback on this visit. Please hit shift and click the following link to let us know if the Virtualist service met your expectations. [email or web address]. [email or web address]      Electronically signed by KIERA Holley CNP on 1/20/23 at 9:51 AM EST.

## 2023-01-20 NOTE — PROGRESS NOTES
Rosanne Eye (:  1980) is a Established patient, here for evaluation of the following:    Cough (With congestion and fever x 5 days (Monday))       Assessment & Plan:  Below is the assessment and plan developed based on review of pertinent history, physical exam, labs, studies, and medications. 1. Viral URI with cough  -     benzonatate (TESSALON) 200 MG capsule; Take 1 capsule by mouth 3 times daily as needed for Cough, Disp-30 capsule, R-0Normal  -     promethazine-dextromethorphan (PROMETHAZINE-DM) 6.25-15 MG/5ML syrup; Take 5 mLs by mouth nightly as needed for Cough, Disp-100 mL, R-0Normal  -     Hypertonic Nasal Wash (SINUS RINSE BOTTLE KIT) PACK; 1 packet by Nasal route 2 times daily as needed (congestion), Disp-1 each, R-0Normal  -     COVID-19 At Home Antigen Test KIT; 1 kit by In Vitro route once for 1 dose, Disp-1 kit, R-0Normal  Patient aware to wear a mask at all times unless his test is negative. He was advised to seek urgent medical care for fever >102 not controlled by medication, severe shortness of breath or wheezing, or chest pain. ASE of medications were explained and patient will not use sedating products such as alcohol, tylenol PM, or other sedating pain medications in addition to his medications. Return if symptoms worsen or fail to improve, for viral uri with cough. Subjective: three weeks ago had sinus congestion and drainage. Last week felt fine. Then Monday he felt chest congestion, sinus congestion, then last night started with a fever. TMax 103. Fever is gone today  Cough  This is a new problem. The current episode started in the past 7 days. The problem occurs constantly. The cough is Productive of sputum and productive of purulent sputum. Associated symptoms include chest pain (with coughing), chills, ear congestion, ear pain, a fever, nasal congestion, postnasal drip, rhinorrhea, a sore throat and wheezing.  Pertinent negatives include no headaches, myalgias or shortness of breath. Treatments tried: dayquil. The treatment provided moderate relief. no lung conditions   Review of Systems   Constitutional:  Positive for chills and fever. HENT:  Positive for ear pain, postnasal drip, rhinorrhea and sore throat. Respiratory:  Positive for cough and wheezing. Negative for shortness of breath. Cardiovascular:  Positive for chest pain (with coughing). Musculoskeletal:  Negative for myalgias. Neurological:  Negative for headaches. Hoarse voice  Pain with self-palpation of right side maxillary sinuses  Tenderness with self-palpation of submandibular lymph nodes  No Dayquil since yesterday but no fever today either    Objective:  Patient-Reported Vitals  No data recorded     No flowsheet data found.      Physical Exam:  [INSTRUCTIONS:  \"[x]\" Indicates a positive item  \"[]\" Indicates a negative item  -- DELETE ALL ITEMS NOT EXAMINED]    Constitutional: [x] Appears well-developed and well-nourished [x] No apparent distress      [] Abnormal -     Mental status: [x] Alert and awake  [x] Oriented to person/place/time [x] Able to follow commands    [] Abnormal -     Eyes:   EOM    [x]  Normal    [] Abnormal -   Sclera  [x]  Normal    [] Abnormal -          Discharge [x]  None visible   [] Abnormal -     HENT: [x] Normocephalic, atraumatic  [] Abnormal -   [x] Mouth/Throat: Mucous membranes are moist    External Ears [x] Normal  [] Abnormal -    Neck: [x] No visualized mass [] Abnormal -     Pulmonary/Chest: [x] Respiratory effort normal   [x] No visualized signs of difficulty breathing or respiratory distress        [] Abnormal -      Musculoskeletal:   [] Normal gait with no signs of ataxia         [x] Normal range of motion of neck        [] Abnormal -     Neurological:        [x] No Facial Asymmetry (Cranial nerve 7 motor function) (limited exam due to video visit)          [x] No gaze palsy        [] Abnormal -          Skin:        [x] No significant exanthematous lesions or discoloration noted on facial skin         [] Abnormal -            Psychiatric:       [x] Normal Affect [] Abnormal -        [] No Hallucinations    Other pertinent observable physical exam findings:-        On this date 1/20/2023 I have spent 24 minutes reviewing previous notes, test results and face to face (virtual) with the patient discussing the diagnosis and importance of compliance with the treatment plan as well as documenting on the day of the visit. Charlotta Ahumada, was evaluated through a synchronous (real-time) audio-video encounter. The patient (or guardian if applicable) is aware that this is a billable service, which includes applicable co-pays. This Virtual Visit was conducted with patient's (and/or legal guardian's) consent. The visit was conducted pursuant to the emergency declaration under the [address] [place] [address], [address] [place] [address] authority and the [employer] and [employer] General Act. Patient identification was verified, and a caregiver was present when appropriate. The patient was located at Other: [place], [place] .    Provider was located at Home ([facility]): [address], [facility]

## 2023-04-19 ENCOUNTER — OFFICE VISIT (OUTPATIENT)
Dept: FAMILY MEDICINE CLINIC | Age: 43
End: 2023-04-19
Payer: COMMERCIAL

## 2023-04-19 VITALS
OXYGEN SATURATION: 98 % | BODY MASS INDEX: 36.18 KG/M2 | WEIGHT: 273 LBS | HEART RATE: 77 BPM | HEIGHT: 73 IN | SYSTOLIC BLOOD PRESSURE: 110 MMHG | DIASTOLIC BLOOD PRESSURE: 60 MMHG | TEMPERATURE: 98.1 F

## 2023-04-19 DIAGNOSIS — L98.9 LESION OF SKIN OF NOSE: Primary | ICD-10-CM

## 2023-04-19 PROCEDURE — 99213 OFFICE O/P EST LOW 20 MIN: CPT | Performed by: FAMILY MEDICINE

## 2023-04-19 SDOH — ECONOMIC STABILITY: HOUSING INSECURITY
IN THE LAST 12 MONTHS, WAS THERE A TIME WHEN YOU DID NOT HAVE A STEADY PLACE TO SLEEP OR SLEPT IN A SHELTER (INCLUDING NOW)?: NO

## 2023-04-19 SDOH — ECONOMIC STABILITY: INCOME INSECURITY: HOW HARD IS IT FOR YOU TO PAY FOR THE VERY BASICS LIKE FOOD, HOUSING, MEDICAL CARE, AND HEATING?: NOT HARD AT ALL

## 2023-04-19 SDOH — ECONOMIC STABILITY: FOOD INSECURITY: WITHIN THE PAST 12 MONTHS, YOU WORRIED THAT YOUR FOOD WOULD RUN OUT BEFORE YOU GOT MONEY TO BUY MORE.: NEVER TRUE

## 2023-04-19 SDOH — ECONOMIC STABILITY: FOOD INSECURITY: WITHIN THE PAST 12 MONTHS, THE FOOD YOU BOUGHT JUST DIDN'T LAST AND YOU DIDN'T HAVE MONEY TO GET MORE.: NEVER TRUE

## 2023-04-19 ASSESSMENT — PATIENT HEALTH QUESTIONNAIRE - PHQ9
SUM OF ALL RESPONSES TO PHQ QUESTIONS 1-9: 2
SUM OF ALL RESPONSES TO PHQ QUESTIONS 1-9: 2
8. MOVING OR SPEAKING SO SLOWLY THAT OTHER PEOPLE COULD HAVE NOTICED. OR THE OPPOSITE, BEING SO FIGETY OR RESTLESS THAT YOU HAVE BEEN MOVING AROUND A LOT MORE THAN USUAL: 0
6. FEELING BAD ABOUT YOURSELF - OR THAT YOU ARE A FAILURE OR HAVE LET YOURSELF OR YOUR FAMILY DOWN: 0
SUM OF ALL RESPONSES TO PHQ9 QUESTIONS 1 & 2: 0
4. FEELING TIRED OR HAVING LITTLE ENERGY: 1
1. LITTLE INTEREST OR PLEASURE IN DOING THINGS: 0
SUM OF ALL RESPONSES TO PHQ QUESTIONS 1-9: 2
SUM OF ALL RESPONSES TO PHQ QUESTIONS 1-9: 2
10. IF YOU CHECKED OFF ANY PROBLEMS, HOW DIFFICULT HAVE THESE PROBLEMS MADE IT FOR YOU TO DO YOUR WORK, TAKE CARE OF THINGS AT HOME, OR GET ALONG WITH OTHER PEOPLE: 0
5. POOR APPETITE OR OVEREATING: 0
2. FEELING DOWN, DEPRESSED OR HOPELESS: 0
9. THOUGHTS THAT YOU WOULD BE BETTER OFF DEAD, OR OF HURTING YOURSELF: 0
3. TROUBLE FALLING OR STAYING ASLEEP: 1
7. TROUBLE CONCENTRATING ON THINGS, SUCH AS READING THE NEWSPAPER OR WATCHING TELEVISION: 0

## 2023-04-19 ASSESSMENT — ENCOUNTER SYMPTOMS
COLOR CHANGE: 0
NAUSEA: 0
VOMITING: 0
SHORTNESS OF BREATH: 0

## 2023-04-19 NOTE — PROGRESS NOTES
effort is normal. No respiratory distress. Musculoskeletal:         General: No tenderness or deformity. Normal range of motion. Cervical back: Normal range of motion and neck supple. Lymphadenopathy:      Cervical: No cervical adenopathy. Skin:     General: Skin is warm and dry. Capillary Refill: Capillary refill takes 2 to 3 seconds. Coloration: Skin is not pale. Findings: Lesion present. No erythema or rash. Neurological:      Mental Status: He is alert and oriented to person, place, and time. Sensory: No sensory deficit. Psychiatric:         Behavior: Behavior normal.         Thought Content: Thought content normal.         Judgment: Judgment normal.             Plan  1. Lesion of skin of nose  Monitor for 3 mo  If any changes noted, will send to Dermatology         While assessing care for this patient, I have reviewed all pertinent lab work/imaging/ specialist notes and care in reference to those problems addressed above in detail. Appropriate medical decision making was based on this. Please note that portions of this note may have been completed with a voice recognition program. Efforts were made to edit the dictations but occasionally words are mis-transcribed. Return if symptoms worsen or fail to improve.

## 2023-06-21 ENCOUNTER — OFFICE VISIT (OUTPATIENT)
Dept: FAMILY MEDICINE CLINIC | Age: 43
End: 2023-06-21
Payer: COMMERCIAL

## 2023-06-21 VITALS
DIASTOLIC BLOOD PRESSURE: 62 MMHG | HEIGHT: 73 IN | BODY MASS INDEX: 36.82 KG/M2 | OXYGEN SATURATION: 96 % | HEART RATE: 75 BPM | SYSTOLIC BLOOD PRESSURE: 108 MMHG | TEMPERATURE: 98.4 F | WEIGHT: 277.8 LBS

## 2023-06-21 DIAGNOSIS — Z00.00 ENCOUNTER FOR WELL ADULT EXAM WITHOUT ABNORMAL FINDINGS: Primary | ICD-10-CM

## 2023-06-21 DIAGNOSIS — M25.512 CHRONIC LEFT SHOULDER PAIN: ICD-10-CM

## 2023-06-21 DIAGNOSIS — E66.09 CLASS 2 OBESITY DUE TO EXCESS CALORIES WITH BODY MASS INDEX (BMI) OF 36.0 TO 36.9 IN ADULT, UNSPECIFIED WHETHER SERIOUS COMORBIDITY PRESENT: ICD-10-CM

## 2023-06-21 DIAGNOSIS — G89.29 CHRONIC LEFT SHOULDER PAIN: ICD-10-CM

## 2023-06-21 DIAGNOSIS — R73.03 PREDIABETES: ICD-10-CM

## 2023-06-21 DIAGNOSIS — E55.9 VITAMIN D DEFICIENCY: ICD-10-CM

## 2023-06-21 PROCEDURE — 99396 PREV VISIT EST AGE 40-64: CPT | Performed by: FAMILY MEDICINE

## 2023-06-21 ASSESSMENT — ENCOUNTER SYMPTOMS
TROUBLE SWALLOWING: 0
NAUSEA: 0
CONSTIPATION: 0
BACK PAIN: 0
COUGH: 0
COLOR CHANGE: 0
ABDOMINAL PAIN: 0
BLOOD IN STOOL: 0
VOMITING: 0
DIARRHEA: 0
SHORTNESS OF BREATH: 0

## 2023-06-21 NOTE — PROGRESS NOTES
Heart sounds: Normal heart sounds. No murmur heard. No friction rub. No gallop. Pulmonary:      Effort: Pulmonary effort is normal. No respiratory distress. Breath sounds: Normal breath sounds. No wheezing or rales. Chest:      Chest wall: No tenderness. Abdominal:      Palpations: Abdomen is soft. Musculoskeletal:         General: Normal range of motion. Cervical back: Normal range of motion and neck supple. Skin:     General: Skin is warm and dry. Capillary Refill: Capillary refill takes 2 to 3 seconds. Findings: No erythema. Neurological:      Mental Status: He is alert and oriented to person, place, and time. Cranial Nerves: No cranial nerve deficit. Psychiatric:         Behavior: Behavior normal.         Thought Content: Thought content normal.         Judgment: Judgment normal.       Assessment   Plan   1. Encounter for well adult exam without abnormal findings  Colonoscopy at 39, PSA next year     2. Class 2 obesity due to excess calories with body mass index (BMI) of 36.0 to 36.9 in adult, unspecified whether serious comorbidity present    3. Chronic left shoulder pain  Worried about L arm/ L hand pain. All fingers  Discussed home exercises vs formal PT, preferred the first, h/o's given   Encouraged better posture support and increased core strength  Medication per below  Has seen Ortho for chronic shoulder pain, s/p MRI and injection   -     diclofenac sodium (VOLTAREN) 1 % GEL; Apply 4 g topically 4 times daily, Topical, 4 TIMES DAILY Starting Wed 6/21/2023, Disp-100 g, R-1, Normal  4. Prediabetes  5.  Vitamin D deficiency  Rec'd daily OTC supplementation          Personalized Preventive Plan   Current Health Maintenance Status  Immunization History   Administered Date(s) Administered    COVID-19, MODERNA BLUE border, Primary or Immunocompromised, (age 12y+), IM, 100 mcg/0.5mL 05/05/2021, 06/02/2021    COVID-19, PFIZER GRAY top, DO NOT Dilute, (age 15 y+), IM, 27

## 2025-07-15 ENCOUNTER — INITIAL CONSULT (OUTPATIENT)
Dept: SURGERY | Age: 45
End: 2025-07-15
Payer: COMMERCIAL

## 2025-07-15 VITALS
OXYGEN SATURATION: 97 % | HEART RATE: 93 BPM | BODY MASS INDEX: 35.78 KG/M2 | WEIGHT: 270 LBS | HEIGHT: 73 IN | SYSTOLIC BLOOD PRESSURE: 108 MMHG | DIASTOLIC BLOOD PRESSURE: 70 MMHG

## 2025-07-15 DIAGNOSIS — D48.5 NEOPLASM OF UNCERTAIN BEHAVIOR OF SKIN: Primary | ICD-10-CM

## 2025-07-15 PROCEDURE — 99203 OFFICE O/P NEW LOW 30 MIN: CPT | Performed by: SURGERY

## 2025-07-15 NOTE — PROGRESS NOTES
New Patient Consult    [facility]  [facility]  Claus Mccarty MD    [address], [address]  [place] [address]  [phone]    Burke Hurley   YOB: 1980    Date of Visit:  7/15/2025    Jose L      Chief Complaint: Right shoulder lesion    HPI: Patient presents for evaluation of a lesion on his right shoulder.  He is not sure how long he has had it.  It was noticed recently on exam.  He denies any discomfort related to the lesion.  It does not itch or bleed.  He is not sure if it has gotten larger    No Known Allergies  No outpatient medications have been marked as taking for the 7/15/25 encounter (Initial consult) with Claus Mccarty MD.       Past Medical History:   Diagnosis Date    History of chicken pox      Past Surgical History:   Procedure Laterality Date    HERNIA REPAIR  1984     Family History   Problem Relation Age of Onset    Mult Sclerosis Mother         2012    No Known Problems Father     No Known Problems Sister     No Known Problems Sister     No Known Problems Sister     Colon Cancer Maternal Grandmother         60s    Colon Cancer Maternal Grandfather         50s    Cancer Maternal Grandfather         Prostate/colon    Colon Cancer Paternal Great Grandfather         90s    Colon Cancer Paternal Great Grandmother         80s    Cancer Paternal Grandmother         Colon     Social History     Socioeconomic History    Marital status:      Spouse name: Not on file    Number of children: 1    Years of education: Not on file    Highest education level: Not on file   Occupational History    Not on file   Tobacco Use    Smoking status: Every Day     Current packs/day: 0.50     Average packs/day: 0.5 packs/day for 24.5 years (12.3 ttl pk-yrs)     Types: Cigarettes     Start date: 1/1/1996     Last attempt to quit: 1/1/2017    Smokeless tobacco: Never   Vaping Use    Vaping status: Never Used   Substance and Sexual Activity